# Patient Record
Sex: FEMALE | Race: BLACK OR AFRICAN AMERICAN | NOT HISPANIC OR LATINO | Employment: OTHER | URBAN - METROPOLITAN AREA
[De-identification: names, ages, dates, MRNs, and addresses within clinical notes are randomized per-mention and may not be internally consistent; named-entity substitution may affect disease eponyms.]

---

## 2023-06-05 LAB
ALANINE AMINOTRANSFERASE (SGPT) (U/L) IN SER/PLAS: 18 U/L (ref 7–45)
ALBUMIN (G/DL) IN SER/PLAS: 2.4 G/DL (ref 3.4–5)
ALKALINE PHOSPHATASE (U/L) IN SER/PLAS: 194 U/L (ref 33–110)
ANION GAP IN SER/PLAS: 10 MMOL/L (ref 10–20)
ASPARTATE AMINOTRANSFERASE (SGOT) (U/L) IN SER/PLAS: 30 U/L (ref 9–39)
BASOPHILS (10*3/UL) IN BLOOD BY AUTOMATED COUNT: 0.02 X10E9/L (ref 0–0.1)
BASOPHILS/100 LEUKOCYTES IN BLOOD BY AUTOMATED COUNT: 0.4 % (ref 0–2)
BILIRUBIN TOTAL (MG/DL) IN SER/PLAS: 0.3 MG/DL (ref 0–1.2)
BURR CELLS PRESENCE IN BLOOD BY LIGHT MICROSCOPY: NORMAL
CALCIUM (MG/DL) IN SER/PLAS: 7.8 MG/DL (ref 8.6–10.3)
CARBON DIOXIDE, TOTAL (MMOL/L) IN SER/PLAS: 21 MMOL/L (ref 21–32)
CHLORIDE (MMOL/L) IN SER/PLAS: 105 MMOL/L (ref 98–107)
CREATININE (MG/DL) IN SER/PLAS: 0.88 MG/DL (ref 0.5–1.05)
DACROCYTES PRESENCE IN BLOOD BY LIGHT MICROSCOPY: NORMAL
EOSINOPHILS (10*3/UL) IN BLOOD BY AUTOMATED COUNT: 0.08 X10E9/L (ref 0–0.7)
EOSINOPHILS/100 LEUKOCYTES IN BLOOD BY AUTOMATED COUNT: 1.6 % (ref 0–6)
ERYTHROCYTE DISTRIBUTION WIDTH (RATIO) BY AUTOMATED COUNT: 20.7 % (ref 11.5–14.5)
ERYTHROCYTE MEAN CORPUSCULAR HEMOGLOBIN CONCENTRATION (G/DL) BY AUTOMATED: 29.6 G/DL (ref 32–36)
ERYTHROCYTE MEAN CORPUSCULAR VOLUME (FL) BY AUTOMATED COUNT: 87 FL (ref 80–100)
ERYTHROCYTES (10*6/UL) IN BLOOD BY AUTOMATED COUNT: 3.69 X10E12/L (ref 4–5.2)
GFR FEMALE: 82 ML/MIN/1.73M2
GLUCOSE (MG/DL) IN SER/PLAS: 60 MG/DL (ref 74–99)
HEMATOCRIT (%) IN BLOOD BY AUTOMATED COUNT: 32.1 % (ref 36–46)
HEMOGLOBIN (G/DL) IN BLOOD: 9.5 G/DL (ref 12–16)
IMMATURE GRANULOCYTES/100 LEUKOCYTES IN BLOOD BY AUTOMATED COUNT: 0.2 % (ref 0–0.9)
LEUKOCYTES (10*3/UL) IN BLOOD BY AUTOMATED COUNT: 4.9 X10E9/L (ref 4.4–11.3)
LYMPHOCYTES (10*3/UL) IN BLOOD BY AUTOMATED COUNT: 1.75 X10E9/L (ref 1.2–4.8)
LYMPHOCYTES/100 LEUKOCYTES IN BLOOD BY AUTOMATED COUNT: 36 % (ref 13–44)
MONOCYTES (10*3/UL) IN BLOOD BY AUTOMATED COUNT: 0.53 X10E9/L (ref 0.1–1)
MONOCYTES/100 LEUKOCYTES IN BLOOD BY AUTOMATED COUNT: 10.9 % (ref 2–10)
NEUTROPHILS (10*3/UL) IN BLOOD BY AUTOMATED COUNT: 2.47 X10E9/L (ref 1.2–7.7)
NEUTROPHILS/100 LEUKOCYTES IN BLOOD BY AUTOMATED COUNT: 50.9 % (ref 40–80)
OVALOCYTES PRESENCE IN BLOOD BY LIGHT MICROSCOPY: NORMAL
PLATELET CLUMP (PRESENCE) IN BLOOD BY LIGHT MICROSCOPY: PRESENT
PLATELETS (10*3/UL) IN BLOOD AUTOMATED COUNT: 307 X10E9/L (ref 150–450)
POTASSIUM (MMOL/L) IN SER/PLAS: 4.4 MMOL/L (ref 3.5–5.3)
PROTEIN TOTAL: 8.1 G/DL (ref 6.4–8.2)
RBC MORPHOLOGY IN BLOOD: NORMAL
SCHISTOCYTES (PRESENCE) IN BLOOD BY LIGHT MICROSCOPY: NORMAL
SODIUM (MMOL/L) IN SER/PLAS: 132 MMOL/L (ref 136–145)
TRIGLYCERIDE (MG/DL) IN SER/PLAS: 185 MG/DL (ref 0–149)
UREA NITROGEN (MG/DL) IN SER/PLAS: 28 MG/DL (ref 6–23)

## 2023-06-06 LAB
MAGNESIUM (MG/DL) IN SER/PLAS: 2.6 MG/DL (ref 1.6–2.4)
PHOSPHATE (MG/DL) IN SER/PLAS: 3.9 MG/DL (ref 2.5–4.9)

## 2023-06-12 LAB
ALANINE AMINOTRANSFERASE (SGPT) (U/L) IN SER/PLAS: 14 U/L (ref 7–45)
ALBUMIN (G/DL) IN SER/PLAS: 2.6 G/DL (ref 3.4–5)
ALKALINE PHOSPHATASE (U/L) IN SER/PLAS: 174 U/L (ref 33–110)
ANION GAP IN SER/PLAS: 10 MMOL/L (ref 10–20)
ASPARTATE AMINOTRANSFERASE (SGOT) (U/L) IN SER/PLAS: 21 U/L (ref 9–39)
BILIRUBIN TOTAL (MG/DL) IN SER/PLAS: 0.3 MG/DL (ref 0–1.2)
CALCIUM (MG/DL) IN SER/PLAS: 8 MG/DL (ref 8.6–10.3)
CARBON DIOXIDE, TOTAL (MMOL/L) IN SER/PLAS: 20 MMOL/L (ref 21–32)
CHLORIDE (MMOL/L) IN SER/PLAS: 103 MMOL/L (ref 98–107)
CREATININE (MG/DL) IN SER/PLAS: 1.35 MG/DL (ref 0.5–1.05)
ERYTHROCYTE DISTRIBUTION WIDTH (RATIO) BY AUTOMATED COUNT: 18.4 % (ref 11.5–14.5)
ERYTHROCYTE MEAN CORPUSCULAR HEMOGLOBIN CONCENTRATION (G/DL) BY AUTOMATED: 31.3 G/DL (ref 32–36)
ERYTHROCYTE MEAN CORPUSCULAR VOLUME (FL) BY AUTOMATED COUNT: 84 FL (ref 80–100)
ERYTHROCYTES (10*6/UL) IN BLOOD BY AUTOMATED COUNT: 3.8 X10E12/L (ref 4–5.2)
GFR FEMALE: 49 ML/MIN/1.73M2
GLUCOSE (MG/DL) IN SER/PLAS: 71 MG/DL (ref 74–99)
HEMATOCRIT (%) IN BLOOD BY AUTOMATED COUNT: 31.9 % (ref 36–46)
HEMOGLOBIN (G/DL) IN BLOOD: 10 G/DL (ref 12–16)
LEUKOCYTES (10*3/UL) IN BLOOD BY AUTOMATED COUNT: 5.7 X10E9/L (ref 4.4–11.3)
MAGNESIUM (MG/DL) IN SER/PLAS: 2.5 MG/DL (ref 1.6–2.4)
PHOSPHATE (MG/DL) IN SER/PLAS: 3.9 MG/DL (ref 2.5–4.9)
PLATELETS (10*3/UL) IN BLOOD AUTOMATED COUNT: 271 X10E9/L (ref 150–450)
POTASSIUM (MMOL/L) IN SER/PLAS: 4.3 MMOL/L (ref 3.5–5.3)
PROTEIN TOTAL: 8.8 G/DL (ref 6.4–8.2)
SODIUM (MMOL/L) IN SER/PLAS: 129 MMOL/L (ref 136–145)
UREA NITROGEN (MG/DL) IN SER/PLAS: 31 MG/DL (ref 6–23)

## 2023-06-26 LAB
ALANINE AMINOTRANSFERASE (SGPT) (U/L) IN SER/PLAS: 8 U/L (ref 7–45)
ALBUMIN (G/DL) IN SER/PLAS: 2.3 G/DL (ref 3.4–5)
ALKALINE PHOSPHATASE (U/L) IN SER/PLAS: 168 U/L (ref 33–110)
ANION GAP IN SER/PLAS: 12 MMOL/L (ref 10–20)
ASPARTATE AMINOTRANSFERASE (SGOT) (U/L) IN SER/PLAS: 15 U/L (ref 9–39)
BASOPHILS (10*3/UL) IN BLOOD BY AUTOMATED COUNT: 0.01 X10E9/L (ref 0–0.1)
BASOPHILS/100 LEUKOCYTES IN BLOOD BY AUTOMATED COUNT: 0.1 % (ref 0–2)
BILIRUBIN TOTAL (MG/DL) IN SER/PLAS: 0.4 MG/DL (ref 0–1.2)
CALCIUM (MG/DL) IN SER/PLAS: 7.7 MG/DL (ref 8.6–10.3)
CARBON DIOXIDE, TOTAL (MMOL/L) IN SER/PLAS: 24 MMOL/L (ref 21–32)
CHLORIDE (MMOL/L) IN SER/PLAS: 102 MMOL/L (ref 98–107)
CREATININE (MG/DL) IN SER/PLAS: 1.3 MG/DL (ref 0.5–1.05)
EOSINOPHILS (10*3/UL) IN BLOOD BY AUTOMATED COUNT: 0.05 X10E9/L (ref 0–0.7)
EOSINOPHILS/100 LEUKOCYTES IN BLOOD BY AUTOMATED COUNT: 0.7 % (ref 0–6)
ERYTHROCYTE DISTRIBUTION WIDTH (RATIO) BY AUTOMATED COUNT: 17 % (ref 11.5–14.5)
ERYTHROCYTE MEAN CORPUSCULAR HEMOGLOBIN CONCENTRATION (G/DL) BY AUTOMATED: 32 G/DL (ref 32–36)
ERYTHROCYTE MEAN CORPUSCULAR VOLUME (FL) BY AUTOMATED COUNT: 82 FL (ref 80–100)
ERYTHROCYTES (10*6/UL) IN BLOOD BY AUTOMATED COUNT: 3.44 X10E12/L (ref 4–5.2)
GFR FEMALE: 51 ML/MIN/1.73M2
GLUCOSE (MG/DL) IN SER/PLAS: 84 MG/DL (ref 74–99)
HEMATOCRIT (%) IN BLOOD BY AUTOMATED COUNT: 28.1 % (ref 36–46)
HEMOGLOBIN (G/DL) IN BLOOD: 9 G/DL (ref 12–16)
IMMATURE GRANULOCYTES/100 LEUKOCYTES IN BLOOD BY AUTOMATED COUNT: 0.4 % (ref 0–0.9)
LEUKOCYTES (10*3/UL) IN BLOOD BY AUTOMATED COUNT: 7.4 X10E9/L (ref 4.4–11.3)
LYMPHOCYTES (10*3/UL) IN BLOOD BY AUTOMATED COUNT: 1.03 X10E9/L (ref 1.2–4.8)
LYMPHOCYTES/100 LEUKOCYTES IN BLOOD BY AUTOMATED COUNT: 14 % (ref 13–44)
MAGNESIUM (MG/DL) IN SER/PLAS: 1.9 MG/DL (ref 1.6–2.4)
MONOCYTES (10*3/UL) IN BLOOD BY AUTOMATED COUNT: 0.56 X10E9/L (ref 0.1–1)
MONOCYTES/100 LEUKOCYTES IN BLOOD BY AUTOMATED COUNT: 7.6 % (ref 2–10)
NEUTROPHILS (10*3/UL) IN BLOOD BY AUTOMATED COUNT: 5.69 X10E9/L (ref 1.2–7.7)
NEUTROPHILS/100 LEUKOCYTES IN BLOOD BY AUTOMATED COUNT: 77.2 % (ref 40–80)
PHOSPHATE (MG/DL) IN SER/PLAS: 2.6 MG/DL (ref 2.5–4.9)
PLATELETS (10*3/UL) IN BLOOD AUTOMATED COUNT: 260 X10E9/L (ref 150–450)
POTASSIUM (MMOL/L) IN SER/PLAS: 3.5 MMOL/L (ref 3.5–5.3)
PROTEIN TOTAL: 7.6 G/DL (ref 6.4–8.2)
SODIUM (MMOL/L) IN SER/PLAS: 134 MMOL/L (ref 136–145)
TRIGLYCERIDE (MG/DL) IN SER/PLAS: 91 MG/DL (ref 0–149)
UREA NITROGEN (MG/DL) IN SER/PLAS: 32 MG/DL (ref 6–23)

## 2023-07-03 LAB
ALANINE AMINOTRANSFERASE (SGPT) (U/L) IN SER/PLAS: 8 U/L (ref 7–45)
ALBUMIN (G/DL) IN SER/PLAS: 2.2 G/DL (ref 3.4–5)
ALKALINE PHOSPHATASE (U/L) IN SER/PLAS: 188 U/L (ref 33–110)
ANION GAP IN SER/PLAS: 10 MMOL/L (ref 10–20)
ASPARTATE AMINOTRANSFERASE (SGOT) (U/L) IN SER/PLAS: 21 U/L (ref 9–39)
BILIRUBIN TOTAL (MG/DL) IN SER/PLAS: 0.6 MG/DL (ref 0–1.2)
CALCIUM (MG/DL) IN SER/PLAS: 7.2 MG/DL (ref 8.6–10.3)
CARBON DIOXIDE, TOTAL (MMOL/L) IN SER/PLAS: 29 MMOL/L (ref 21–32)
CHLORIDE (MMOL/L) IN SER/PLAS: 98 MMOL/L (ref 98–107)
CREATININE (MG/DL) IN SER/PLAS: 1.37 MG/DL (ref 0.5–1.05)
ERYTHROCYTE DISTRIBUTION WIDTH (RATIO) BY AUTOMATED COUNT: 16.1 % (ref 11.5–14.5)
ERYTHROCYTE MEAN CORPUSCULAR HEMOGLOBIN CONCENTRATION (G/DL) BY AUTOMATED: 31 G/DL (ref 32–36)
ERYTHROCYTE MEAN CORPUSCULAR VOLUME (FL) BY AUTOMATED COUNT: 82 FL (ref 80–100)
ERYTHROCYTES (10*6/UL) IN BLOOD BY AUTOMATED COUNT: 3 X10E12/L (ref 4–5.2)
GFR FEMALE: 48 ML/MIN/1.73M2
GLUCOSE (MG/DL) IN SER/PLAS: 83 MG/DL (ref 74–99)
HEMATOCRIT (%) IN BLOOD BY AUTOMATED COUNT: 24.5 % (ref 36–46)
HEMOGLOBIN (G/DL) IN BLOOD: 7.6 G/DL (ref 12–16)
LEUKOCYTES (10*3/UL) IN BLOOD BY AUTOMATED COUNT: 4.8 X10E9/L (ref 4.4–11.3)
MAGNESIUM (MG/DL) IN SER/PLAS: 2.2 MG/DL (ref 1.6–2.4)
PHOSPHATE (MG/DL) IN SER/PLAS: 2.6 MG/DL (ref 2.5–4.9)
PLATELETS (10*3/UL) IN BLOOD AUTOMATED COUNT: 265 X10E9/L (ref 150–450)
POTASSIUM (MMOL/L) IN SER/PLAS: 3.3 MMOL/L (ref 3.5–5.3)
PROTEIN TOTAL: 7.7 G/DL (ref 6.4–8.2)
SODIUM (MMOL/L) IN SER/PLAS: 134 MMOL/L (ref 136–145)
TRIGLYCERIDE (MG/DL) IN SER/PLAS: 131 MG/DL (ref 0–149)
UREA NITROGEN (MG/DL) IN SER/PLAS: 43 MG/DL (ref 6–23)

## 2023-07-10 LAB
ALANINE AMINOTRANSFERASE (SGPT) (U/L) IN SER/PLAS: 6 U/L (ref 7–45)
ALBUMIN (G/DL) IN SER/PLAS: 2.2 G/DL (ref 3.4–5)
ALKALINE PHOSPHATASE (U/L) IN SER/PLAS: 165 U/L (ref 33–110)
ANION GAP IN SER/PLAS: 12 MMOL/L (ref 10–20)
ASPARTATE AMINOTRANSFERASE (SGOT) (U/L) IN SER/PLAS: 13 U/L (ref 9–39)
BASOPHILS (10*3/UL) IN BLOOD BY AUTOMATED COUNT: 0.01 X10E9/L (ref 0–0.1)
BASOPHILS/100 LEUKOCYTES IN BLOOD BY AUTOMATED COUNT: 0.2 % (ref 0–2)
BILIRUBIN TOTAL (MG/DL) IN SER/PLAS: 0.3 MG/DL (ref 0–1.2)
CALCIUM (MG/DL) IN SER/PLAS: 7.6 MG/DL (ref 8.6–10.3)
CARBON DIOXIDE, TOTAL (MMOL/L) IN SER/PLAS: 24 MMOL/L (ref 21–32)
CHLORIDE (MMOL/L) IN SER/PLAS: 100 MMOL/L (ref 98–107)
CREATININE (MG/DL) IN SER/PLAS: 1.69 MG/DL (ref 0.5–1.05)
EOSINOPHILS (10*3/UL) IN BLOOD BY AUTOMATED COUNT: 0.02 X10E9/L (ref 0–0.7)
EOSINOPHILS/100 LEUKOCYTES IN BLOOD BY AUTOMATED COUNT: 0.4 % (ref 0–6)
ERYTHROCYTE DISTRIBUTION WIDTH (RATIO) BY AUTOMATED COUNT: 16.3 % (ref 11.5–14.5)
ERYTHROCYTE MEAN CORPUSCULAR HEMOGLOBIN CONCENTRATION (G/DL) BY AUTOMATED: 31 G/DL (ref 32–36)
ERYTHROCYTE MEAN CORPUSCULAR VOLUME (FL) BY AUTOMATED COUNT: 81 FL (ref 80–100)
ERYTHROCYTES (10*6/UL) IN BLOOD BY AUTOMATED COUNT: 2.97 X10E12/L (ref 4–5.2)
GFR FEMALE: 37 ML/MIN/1.73M2
GLUCOSE (MG/DL) IN SER/PLAS: 85 MG/DL (ref 74–99)
HEMATOCRIT (%) IN BLOOD BY AUTOMATED COUNT: 24.2 % (ref 36–46)
HEMOGLOBIN (G/DL) IN BLOOD: 7.5 G/DL (ref 12–16)
IMMATURE GRANULOCYTES/100 LEUKOCYTES IN BLOOD BY AUTOMATED COUNT: 0.4 % (ref 0–0.9)
LEUKOCYTES (10*3/UL) IN BLOOD BY AUTOMATED COUNT: 4.8 X10E9/L (ref 4.4–11.3)
LYMPHOCYTES (10*3/UL) IN BLOOD BY AUTOMATED COUNT: 1.73 X10E9/L (ref 1.2–4.8)
LYMPHOCYTES/100 LEUKOCYTES IN BLOOD BY AUTOMATED COUNT: 35.7 % (ref 13–44)
MAGNESIUM (MG/DL) IN SER/PLAS: 2.4 MG/DL (ref 1.6–2.4)
MONOCYTES (10*3/UL) IN BLOOD BY AUTOMATED COUNT: 0.58 X10E9/L (ref 0.1–1)
MONOCYTES/100 LEUKOCYTES IN BLOOD BY AUTOMATED COUNT: 12 % (ref 2–10)
NEUTROPHILS (10*3/UL) IN BLOOD BY AUTOMATED COUNT: 2.48 X10E9/L (ref 1.2–7.7)
NEUTROPHILS/100 LEUKOCYTES IN BLOOD BY AUTOMATED COUNT: 51.3 % (ref 40–80)
PHOSPHATE (MG/DL) IN SER/PLAS: 4.7 MG/DL (ref 2.5–4.9)
PLATELETS (10*3/UL) IN BLOOD AUTOMATED COUNT: 310 X10E9/L (ref 150–450)
POTASSIUM (MMOL/L) IN SER/PLAS: 3.3 MMOL/L (ref 3.5–5.3)
PROTEIN TOTAL: 7.7 G/DL (ref 6.4–8.2)
SODIUM (MMOL/L) IN SER/PLAS: 133 MMOL/L (ref 136–145)
TRIGLYCERIDE (MG/DL) IN SER/PLAS: 127 MG/DL (ref 0–149)
UREA NITROGEN (MG/DL) IN SER/PLAS: 65 MG/DL (ref 6–23)

## 2023-07-17 LAB
ALANINE AMINOTRANSFERASE (SGPT) (U/L) IN SER/PLAS: 10 U/L (ref 7–45)
ALBUMIN (G/DL) IN SER/PLAS: 1.8 G/DL (ref 3.4–5)
ALKALINE PHOSPHATASE (U/L) IN SER/PLAS: 186 U/L (ref 33–110)
ANION GAP IN SER/PLAS: 10 MMOL/L (ref 10–20)
ASPARTATE AMINOTRANSFERASE (SGOT) (U/L) IN SER/PLAS: 26 U/L (ref 9–39)
BASOPHILS (10*3/UL) IN BLOOD BY AUTOMATED COUNT: 0.01 X10E9/L (ref 0–0.1)
BASOPHILS/100 LEUKOCYTES IN BLOOD BY AUTOMATED COUNT: 0.2 % (ref 0–2)
BILIRUBIN TOTAL (MG/DL) IN SER/PLAS: 0.3 MG/DL (ref 0–1.2)
CALCIUM (MG/DL) IN SER/PLAS: 7.2 MG/DL (ref 8.6–10.3)
CARBON DIOXIDE, TOTAL (MMOL/L) IN SER/PLAS: 24 MMOL/L (ref 21–32)
CHLORIDE (MMOL/L) IN SER/PLAS: 106 MMOL/L (ref 98–107)
CREATININE (MG/DL) IN SER/PLAS: 1.06 MG/DL (ref 0.5–1.05)
EOSINOPHILS (10*3/UL) IN BLOOD BY AUTOMATED COUNT: 0.01 X10E9/L (ref 0–0.7)
EOSINOPHILS/100 LEUKOCYTES IN BLOOD BY AUTOMATED COUNT: 0.2 % (ref 0–6)
ERYTHROCYTE DISTRIBUTION WIDTH (RATIO) BY AUTOMATED COUNT: 16.5 % (ref 11.5–14.5)
ERYTHROCYTE MEAN CORPUSCULAR HEMOGLOBIN CONCENTRATION (G/DL) BY AUTOMATED: 30.5 G/DL (ref 32–36)
ERYTHROCYTE MEAN CORPUSCULAR VOLUME (FL) BY AUTOMATED COUNT: 86 FL (ref 80–100)
ERYTHROCYTES (10*6/UL) IN BLOOD BY AUTOMATED COUNT: 2.71 X10E12/L (ref 4–5.2)
GFR FEMALE: 65 ML/MIN/1.73M2
GLUCOSE (MG/DL) IN SER/PLAS: 69 MG/DL (ref 74–99)
HEMATOCRIT (%) IN BLOOD BY AUTOMATED COUNT: 23.3 % (ref 36–46)
HEMOGLOBIN (G/DL) IN BLOOD: 7.1 G/DL (ref 12–16)
IMMATURE GRANULOCYTES/100 LEUKOCYTES IN BLOOD BY AUTOMATED COUNT: 0.4 % (ref 0–0.9)
LEUKOCYTES (10*3/UL) IN BLOOD BY AUTOMATED COUNT: 5.1 X10E9/L (ref 4.4–11.3)
LYMPHOCYTES (10*3/UL) IN BLOOD BY AUTOMATED COUNT: 1.38 X10E9/L (ref 1.2–4.8)
LYMPHOCYTES/100 LEUKOCYTES IN BLOOD BY AUTOMATED COUNT: 27.3 % (ref 13–44)
MAGNESIUM (MG/DL) IN SER/PLAS: 1.8 MG/DL (ref 1.6–2.4)
MONOCYTES (10*3/UL) IN BLOOD BY AUTOMATED COUNT: 0.51 X10E9/L (ref 0.1–1)
MONOCYTES/100 LEUKOCYTES IN BLOOD BY AUTOMATED COUNT: 10.1 % (ref 2–10)
NEUTROPHILS (10*3/UL) IN BLOOD BY AUTOMATED COUNT: 3.12 X10E9/L (ref 1.2–7.7)
NEUTROPHILS/100 LEUKOCYTES IN BLOOD BY AUTOMATED COUNT: 61.8 % (ref 40–80)
PHOSPHATE (MG/DL) IN SER/PLAS: 2.1 MG/DL (ref 2.5–4.9)
PLATELETS (10*3/UL) IN BLOOD AUTOMATED COUNT: 246 X10E9/L (ref 150–450)
POTASSIUM (MMOL/L) IN SER/PLAS: 3.4 MMOL/L (ref 3.5–5.3)
PROTEIN TOTAL: 6.5 G/DL (ref 6.4–8.2)
SODIUM (MMOL/L) IN SER/PLAS: 137 MMOL/L (ref 136–145)
TRIGLYCERIDE (MG/DL) IN SER/PLAS: 81 MG/DL (ref 0–149)
UREA NITROGEN (MG/DL) IN SER/PLAS: 29 MG/DL (ref 6–23)

## 2023-08-01 LAB
ALANINE AMINOTRANSFERASE (SGPT) (U/L) IN SER/PLAS: 21 U/L (ref 7–45)
ALBUMIN (G/DL) IN SER/PLAS: 2.3 G/DL (ref 3.4–5)
ALKALINE PHOSPHATASE (U/L) IN SER/PLAS: 171 U/L (ref 33–110)
ANION GAP IN SER/PLAS: 11 MMOL/L (ref 10–20)
ASPARTATE AMINOTRANSFERASE (SGOT) (U/L) IN SER/PLAS: 44 U/L (ref 9–39)
BASOPHILS (10*3/UL) IN BLOOD BY AUTOMATED COUNT: 0.03 X10E9/L (ref 0–0.1)
BASOPHILS/100 LEUKOCYTES IN BLOOD BY AUTOMATED COUNT: 0.7 % (ref 0–2)
BILIRUBIN TOTAL (MG/DL) IN SER/PLAS: 0.3 MG/DL (ref 0–1.2)
BURR CELLS PRESENCE IN BLOOD BY LIGHT MICROSCOPY: NORMAL
C REACTIVE PROTEIN (MG/L) IN SER/PLAS: 0.26 MG/DL
CALCIUM (MG/DL) IN SER/PLAS: 8 MG/DL (ref 8.6–10.3)
CARBON DIOXIDE, TOTAL (MMOL/L) IN SER/PLAS: 21 MMOL/L (ref 21–32)
CHLORIDE (MMOL/L) IN SER/PLAS: 108 MMOL/L (ref 98–107)
CREATININE (MG/DL) IN SER/PLAS: 0.88 MG/DL (ref 0.5–1.05)
EOSINOPHILS (10*3/UL) IN BLOOD BY AUTOMATED COUNT: 0.09 X10E9/L (ref 0–0.7)
EOSINOPHILS/100 LEUKOCYTES IN BLOOD BY AUTOMATED COUNT: 2 % (ref 0–6)
ERYTHROCYTE DISTRIBUTION WIDTH (RATIO) BY AUTOMATED COUNT: 20.5 % (ref 11.5–14.5)
ERYTHROCYTE MEAN CORPUSCULAR HEMOGLOBIN CONCENTRATION (G/DL) BY AUTOMATED: 29.6 G/DL (ref 32–36)
ERYTHROCYTE MEAN CORPUSCULAR VOLUME (FL) BY AUTOMATED COUNT: 94 FL (ref 80–100)
ERYTHROCYTES (10*6/UL) IN BLOOD BY AUTOMATED COUNT: 3.49 X10E12/L (ref 4–5.2)
GFR FEMALE: 81 ML/MIN/1.73M2
GLUCOSE (MG/DL) IN SER/PLAS: 77 MG/DL (ref 74–99)
HEMATOCRIT (%) IN BLOOD BY AUTOMATED COUNT: 32.8 % (ref 36–46)
HEMOGLOBIN (G/DL) IN BLOOD: 9.7 G/DL (ref 12–16)
IMMATURE GRANULOCYTES/100 LEUKOCYTES IN BLOOD BY AUTOMATED COUNT: 0 % (ref 0–0.9)
LEUKOCYTES (10*3/UL) IN BLOOD BY AUTOMATED COUNT: 4.6 X10E9/L (ref 4.4–11.3)
LYMPHOCYTES (10*3/UL) IN BLOOD BY AUTOMATED COUNT: 1.86 X10E9/L (ref 1.2–4.8)
LYMPHOCYTES/100 LEUKOCYTES IN BLOOD BY AUTOMATED COUNT: 40.8 % (ref 13–44)
MAGNESIUM (MG/DL) IN SER/PLAS: 1.9 MG/DL (ref 1.6–2.4)
MONOCYTES (10*3/UL) IN BLOOD BY AUTOMATED COUNT: 0.31 X10E9/L (ref 0.1–1)
MONOCYTES/100 LEUKOCYTES IN BLOOD BY AUTOMATED COUNT: 6.8 % (ref 2–10)
NEUTROPHILS (10*3/UL) IN BLOOD BY AUTOMATED COUNT: 2.27 X10E9/L (ref 1.2–7.7)
NEUTROPHILS/100 LEUKOCYTES IN BLOOD BY AUTOMATED COUNT: 49.7 % (ref 40–80)
PHOSPHATE (MG/DL) IN SER/PLAS: 3.9 MG/DL (ref 2.5–4.9)
PLATELETS (10*3/UL) IN BLOOD AUTOMATED COUNT: 301 X10E9/L (ref 150–450)
POTASSIUM (MMOL/L) IN SER/PLAS: 3.9 MMOL/L (ref 3.5–5.3)
PREALBUMIN (MG/DL) IN SERUM OR PLASMA: 16.8 MG/DL (ref 18–40)
PROTEIN TOTAL: 7.8 G/DL (ref 6.4–8.2)
RBC MORPHOLOGY IN BLOOD: NORMAL
SCHISTOCYTES (PRESENCE) IN BLOOD BY LIGHT MICROSCOPY: NORMAL
SEDIMENTATION RATE, ERYTHROCYTE: 77 MM/H (ref 0–20)
SODIUM (MMOL/L) IN SER/PLAS: 136 MMOL/L (ref 136–145)
TRIGLYCERIDE (MG/DL) IN SER/PLAS: 101 MG/DL (ref 0–149)
UREA NITROGEN (MG/DL) IN SER/PLAS: 15 MG/DL (ref 6–23)

## 2023-08-24 LAB
ALANINE AMINOTRANSFERASE (SGPT) (U/L) IN SER/PLAS: 9 U/L (ref 7–45)
ALBUMIN (G/DL) IN SER/PLAS: 2.4 G/DL (ref 3.4–5)
ALKALINE PHOSPHATASE (U/L) IN SER/PLAS: 220 U/L (ref 33–110)
ANION GAP IN SER/PLAS: 12 MMOL/L (ref 10–20)
ASPARTATE AMINOTRANSFERASE (SGOT) (U/L) IN SER/PLAS: 42 U/L (ref 9–39)
BASOPHILS (10*3/UL) IN BLOOD BY AUTOMATED COUNT: 0.03 X10E9/L (ref 0–0.1)
BASOPHILS/100 LEUKOCYTES IN BLOOD BY AUTOMATED COUNT: 0.7 % (ref 0–2)
BILIRUBIN TOTAL (MG/DL) IN SER/PLAS: 0.3 MG/DL (ref 0–1.2)
CALCIUM (MG/DL) IN SER/PLAS: 7.5 MG/DL (ref 8.6–10.3)
CARBON DIOXIDE, TOTAL (MMOL/L) IN SER/PLAS: 18 MMOL/L (ref 21–32)
CHLORIDE (MMOL/L) IN SER/PLAS: 111 MMOL/L (ref 98–107)
CREATINE KINASE (U/L) IN SER/PLAS: 54 U/L (ref 0–215)
CREATININE (MG/DL) IN SER/PLAS: 1.35 MG/DL (ref 0.5–1.05)
EOSINOPHILS (10*3/UL) IN BLOOD BY AUTOMATED COUNT: 0.12 X10E9/L (ref 0–0.7)
EOSINOPHILS/100 LEUKOCYTES IN BLOOD BY AUTOMATED COUNT: 2.6 % (ref 0–6)
ERYTHROCYTE DISTRIBUTION WIDTH (RATIO) BY AUTOMATED COUNT: 17.7 % (ref 11.5–14.5)
ERYTHROCYTE MEAN CORPUSCULAR HEMOGLOBIN CONCENTRATION (G/DL) BY AUTOMATED: 30.4 G/DL (ref 32–36)
ERYTHROCYTE MEAN CORPUSCULAR VOLUME (FL) BY AUTOMATED COUNT: 90 FL (ref 80–100)
ERYTHROCYTES (10*6/UL) IN BLOOD BY AUTOMATED COUNT: 3.17 X10E12/L (ref 4–5.2)
GFR FEMALE: 49 ML/MIN/1.73M2
GLUCOSE (MG/DL) IN SER/PLAS: 87 MG/DL (ref 74–99)
HEMATOCRIT (%) IN BLOOD BY AUTOMATED COUNT: 28.6 % (ref 36–46)
HEMOGLOBIN (G/DL) IN BLOOD: 8.7 G/DL (ref 12–16)
IMMATURE GRANULOCYTES/100 LEUKOCYTES IN BLOOD BY AUTOMATED COUNT: 0.2 % (ref 0–0.9)
LEUKOCYTES (10*3/UL) IN BLOOD BY AUTOMATED COUNT: 4.5 X10E9/L (ref 4.4–11.3)
LYMPHOCYTES (10*3/UL) IN BLOOD BY AUTOMATED COUNT: 1.78 X10E9/L (ref 1.2–4.8)
LYMPHOCYTES/100 LEUKOCYTES IN BLOOD BY AUTOMATED COUNT: 39.3 % (ref 13–44)
MAGNESIUM (MG/DL) IN SER/PLAS: 2.1 MG/DL (ref 1.6–2.4)
MONOCYTES (10*3/UL) IN BLOOD BY AUTOMATED COUNT: 0.43 X10E9/L (ref 0.1–1)
MONOCYTES/100 LEUKOCYTES IN BLOOD BY AUTOMATED COUNT: 9.5 % (ref 2–10)
NEUTROPHILS (10*3/UL) IN BLOOD BY AUTOMATED COUNT: 2.16 X10E9/L (ref 1.2–7.7)
NEUTROPHILS/100 LEUKOCYTES IN BLOOD BY AUTOMATED COUNT: 47.7 % (ref 40–80)
PHOSPHATE (MG/DL) IN SER/PLAS: 4.5 MG/DL (ref 2.5–4.9)
PLATELETS (10*3/UL) IN BLOOD AUTOMATED COUNT: 326 X10E9/L (ref 150–450)
POTASSIUM (MMOL/L) IN SER/PLAS: 4.5 MMOL/L (ref 3.5–5.3)
PROTEIN TOTAL: 7.3 G/DL (ref 6.4–8.2)
SODIUM (MMOL/L) IN SER/PLAS: 136 MMOL/L (ref 136–145)
TRIGLYCERIDE (MG/DL) IN SER/PLAS: 85 MG/DL (ref 0–149)
UREA NITROGEN (MG/DL) IN SER/PLAS: 20 MG/DL (ref 6–23)

## 2023-08-29 LAB
ALANINE AMINOTRANSFERASE (SGPT) (U/L) IN SER/PLAS: 4 U/L (ref 7–45)
ALBUMIN (G/DL) IN SER/PLAS: 2.3 G/DL (ref 3.4–5)
ALKALINE PHOSPHATASE (U/L) IN SER/PLAS: 207 U/L (ref 33–110)
ANION GAP IN SER/PLAS: 9 MMOL/L (ref 10–20)
ASPARTATE AMINOTRANSFERASE (SGOT) (U/L) IN SER/PLAS: 23 U/L (ref 9–39)
BASOPHILS (10*3/UL) IN BLOOD BY AUTOMATED COUNT: 0.02 X10E9/L (ref 0–0.1)
BASOPHILS/100 LEUKOCYTES IN BLOOD BY AUTOMATED COUNT: 0.4 % (ref 0–2)
BILIRUBIN TOTAL (MG/DL) IN SER/PLAS: 0.3 MG/DL (ref 0–1.2)
CALCIUM (MG/DL) IN SER/PLAS: 7.4 MG/DL (ref 8.6–10.3)
CARBON DIOXIDE, TOTAL (MMOL/L) IN SER/PLAS: 18 MMOL/L (ref 21–32)
CHLORIDE (MMOL/L) IN SER/PLAS: 115 MMOL/L (ref 98–107)
CREATINE KINASE (U/L) IN SER/PLAS: 38 U/L (ref 0–215)
CREATININE (MG/DL) IN SER/PLAS: 1.21 MG/DL (ref 0.5–1.05)
EOSINOPHILS (10*3/UL) IN BLOOD BY AUTOMATED COUNT: 0.14 X10E9/L (ref 0–0.7)
EOSINOPHILS/100 LEUKOCYTES IN BLOOD BY AUTOMATED COUNT: 2.8 % (ref 0–6)
ERYTHROCYTE DISTRIBUTION WIDTH (RATIO) BY AUTOMATED COUNT: 17.3 % (ref 11.5–14.5)
ERYTHROCYTE MEAN CORPUSCULAR HEMOGLOBIN CONCENTRATION (G/DL) BY AUTOMATED: 30.7 G/DL (ref 32–36)
ERYTHROCYTE MEAN CORPUSCULAR VOLUME (FL) BY AUTOMATED COUNT: 91 FL (ref 80–100)
ERYTHROCYTES (10*6/UL) IN BLOOD BY AUTOMATED COUNT: 3.4 X10E12/L (ref 4–5.2)
GFR FEMALE: 56 ML/MIN/1.73M2
GLUCOSE (MG/DL) IN SER/PLAS: 67 MG/DL (ref 74–99)
HEMATOCRIT (%) IN BLOOD BY AUTOMATED COUNT: 30.9 % (ref 36–46)
HEMOGLOBIN (G/DL) IN BLOOD: 9.5 G/DL (ref 12–16)
IMMATURE GRANULOCYTES/100 LEUKOCYTES IN BLOOD BY AUTOMATED COUNT: 0.2 % (ref 0–0.9)
LEUKOCYTES (10*3/UL) IN BLOOD BY AUTOMATED COUNT: 5.1 X10E9/L (ref 4.4–11.3)
LYMPHOCYTES (10*3/UL) IN BLOOD BY AUTOMATED COUNT: 1.91 X10E9/L (ref 1.2–4.8)
LYMPHOCYTES/100 LEUKOCYTES IN BLOOD BY AUTOMATED COUNT: 37.8 % (ref 13–44)
MAGNESIUM (MG/DL) IN SER/PLAS: 1.7 MG/DL (ref 1.6–2.4)
MONOCYTES (10*3/UL) IN BLOOD BY AUTOMATED COUNT: 0.39 X10E9/L (ref 0.1–1)
MONOCYTES/100 LEUKOCYTES IN BLOOD BY AUTOMATED COUNT: 7.7 % (ref 2–10)
NEUTROPHILS (10*3/UL) IN BLOOD BY AUTOMATED COUNT: 2.58 X10E9/L (ref 1.2–7.7)
NEUTROPHILS/100 LEUKOCYTES IN BLOOD BY AUTOMATED COUNT: 51.1 % (ref 40–80)
PHOSPHATE (MG/DL) IN SER/PLAS: 3.7 MG/DL (ref 2.5–4.9)
PLATELETS (10*3/UL) IN BLOOD AUTOMATED COUNT: 259 X10E9/L (ref 150–450)
POTASSIUM (MMOL/L) IN SER/PLAS: 4.4 MMOL/L (ref 3.5–5.3)
PROTEIN TOTAL: 7.1 G/DL (ref 6.4–8.2)
SODIUM (MMOL/L) IN SER/PLAS: 138 MMOL/L (ref 136–145)
TRIGLYCERIDE (MG/DL) IN SER/PLAS: 82 MG/DL (ref 0–149)
UREA NITROGEN (MG/DL) IN SER/PLAS: 16 MG/DL (ref 6–23)

## 2023-09-03 PROBLEM — E86.1 HYPOVOLEMIA: Status: ACTIVE | Noted: 2023-06-19

## 2023-09-03 PROBLEM — M17.9 OSTEOARTHRITIS OF KNEE: Status: ACTIVE | Noted: 2023-09-03

## 2023-09-03 PROBLEM — R63.2 POLYPHAGIA: Status: ACTIVE | Noted: 2023-09-03

## 2023-09-03 PROBLEM — K43.2 RECURRENT INCISIONAL HERNIA: Status: ACTIVE | Noted: 2023-09-03

## 2023-09-03 PROBLEM — F41.8 DEPRESSION WITH ANXIETY: Status: ACTIVE | Noted: 2023-09-03

## 2023-09-03 PROBLEM — G47.33 OBSTRUCTIVE SLEEP APNEA: Status: ACTIVE | Noted: 2023-09-03

## 2023-09-03 PROBLEM — R29.6 FREQUENT FALLS: Status: ACTIVE | Noted: 2023-09-03

## 2023-09-03 PROBLEM — I82.409 DVT (DEEP VENOUS THROMBOSIS) (MULTI): Status: ACTIVE | Noted: 2023-09-03

## 2023-09-03 PROBLEM — N18.30 CHRONIC KIDNEY DISEASE, STAGE III (MODERATE) (MULTI): Status: ACTIVE | Noted: 2023-09-03

## 2023-09-03 PROBLEM — H35.413 LATTICE DEGENERATION OF BOTH RETINAS: Status: ACTIVE | Noted: 2023-09-03

## 2023-09-03 PROBLEM — R35.0 URINARY FREQUENCY: Status: ACTIVE | Noted: 2023-09-03

## 2023-09-03 PROBLEM — H52.4 PRESBYOPIA OF BOTH EYES: Status: ACTIVE | Noted: 2023-09-03

## 2023-09-03 PROBLEM — B20 HIV INFECTION (MULTI): Chronic | Status: ACTIVE | Noted: 2021-09-29

## 2023-09-03 PROBLEM — Z21 HIV INFECTION: Chronic | Status: ACTIVE | Noted: 2021-09-29

## 2023-09-03 PROBLEM — F40.10 SOCIAL ANXIETY DISORDER: Status: ACTIVE | Noted: 2021-11-01

## 2023-09-03 PROBLEM — G47.8 POOR SLEEP PATTERN: Status: ACTIVE | Noted: 2023-09-03

## 2023-09-03 PROBLEM — F33.3 DEPRESSION, MAJOR, RECURRENT, SEVERE WITH PSYCHOSIS (MULTI): Chronic | Status: ACTIVE | Noted: 2021-11-01

## 2023-09-03 PROBLEM — I26.99 PULMONARY EMBOLISM (MULTI): Status: ACTIVE | Noted: 2023-09-03

## 2023-09-03 PROBLEM — N17.9 AKI (ACUTE KIDNEY INJURY) (CMS-HCC): Status: ACTIVE | Noted: 2023-06-19

## 2023-09-03 PROBLEM — H31.002 RETINAL SCAR OF LEFT EYE: Status: ACTIVE | Noted: 2023-09-03

## 2023-09-03 PROBLEM — M17.11 DEGENERATIVE JOINT DISEASE OF KNEE, RIGHT: Status: ACTIVE | Noted: 2023-09-03

## 2023-09-03 PROBLEM — E66.01 OBESITY, MORBID (MORE THAN 100 LBS OVER IDEAL WEIGHT OR BMI > 40) (MULTI): Status: ACTIVE | Noted: 2023-09-03

## 2023-09-03 PROBLEM — F43.10 PTSD (POST-TRAUMATIC STRESS DISORDER): Status: ACTIVE | Noted: 2021-09-29

## 2023-09-03 PROBLEM — E55.9 VITAMIN D DEFICIENCY: Status: ACTIVE | Noted: 2023-09-03

## 2023-09-03 PROBLEM — H43.393 VITREOUS OPACITIES OF BOTH EYES: Status: ACTIVE | Noted: 2023-09-03

## 2023-09-03 PROBLEM — I10 HYPERTENSION: Status: ACTIVE | Noted: 2023-09-03

## 2023-09-03 PROBLEM — J45.909 ASTHMA (HHS-HCC): Status: ACTIVE | Noted: 2023-09-03

## 2023-09-03 PROBLEM — O10.919: Status: ACTIVE | Noted: 2023-09-03

## 2023-09-03 PROBLEM — M17.0 PRIMARY OSTEOARTHRITIS OF BOTH KNEES: Status: ACTIVE | Noted: 2023-09-03

## 2023-09-03 PROBLEM — E88.09 SERUM ALBUMIN DECREASED: Status: ACTIVE | Noted: 2023-09-03

## 2023-09-03 PROBLEM — R79.89 ELEVATED SERUM CREATININE: Status: ACTIVE | Noted: 2023-09-03

## 2023-09-03 PROBLEM — T83.9XXA IUD COMPLICATION (CMS-HCC): Status: ACTIVE | Noted: 2023-09-03

## 2023-09-03 PROBLEM — K63.2 ENTEROCUTANEOUS FISTULA: Status: ACTIVE | Noted: 2023-09-03

## 2023-09-03 PROBLEM — K21.9 GERD (GASTROESOPHAGEAL REFLUX DISEASE): Status: ACTIVE | Noted: 2023-09-03

## 2023-09-03 PROBLEM — D50.9 IRON DEFICIENCY ANEMIA: Status: ACTIVE | Noted: 2023-09-03

## 2023-09-03 RX ORDER — TRAZODONE HYDROCHLORIDE 100 MG/1
100 TABLET ORAL
COMMUNITY
Start: 2021-01-19 | End: 2023-10-31

## 2023-09-03 RX ORDER — HYDROCHLOROTHIAZIDE 12.5 MG/1
12.5 CAPSULE ORAL
COMMUNITY
Start: 2021-05-25 | End: 2023-10-18 | Stop reason: SDUPTHER

## 2023-09-03 RX ORDER — ERGOCALCIFEROL 1.25 MG/1
1 CAPSULE ORAL WEEKLY
COMMUNITY
Start: 2017-02-21 | End: 2023-10-10 | Stop reason: WASHOUT

## 2023-09-03 RX ORDER — FERROUS SULFATE 325(65) MG
325 TABLET ORAL
COMMUNITY
Start: 2014-04-09 | End: 2023-10-18 | Stop reason: SDUPTHER

## 2023-09-03 RX ORDER — TENOFOVIR ALAFENAMIDE 25 MG/1
1 TABLET ORAL
COMMUNITY
Start: 2022-09-14 | End: 2023-10-10 | Stop reason: WASHOUT

## 2023-09-03 RX ORDER — POLYETHYLENE GLYCOL 3350 17 G/17G
POWDER, FOR SOLUTION ORAL
COMMUNITY
Start: 2021-01-15 | End: 2023-10-10 | Stop reason: WASHOUT

## 2023-09-03 RX ORDER — ALBUTEROL SULFATE 90 UG/1
1-2 AEROSOL, METERED RESPIRATORY (INHALATION) EVERY 6 HOURS PRN
COMMUNITY
Start: 2014-04-09

## 2023-09-03 RX ORDER — DOLUTEGRAVIR SODIUM AND LAMIVUDINE 50; 300 MG/1; MG/1
1 TABLET, FILM COATED ORAL
COMMUNITY
Start: 2021-10-21 | End: 2023-10-18

## 2023-09-03 RX ORDER — AMLODIPINE BESYLATE 10 MG/1
10 TABLET ORAL
COMMUNITY
Start: 2021-10-15 | End: 2023-10-18 | Stop reason: SDUPTHER

## 2023-09-03 RX ORDER — DORAVIRINE 100 MG/1
1 TABLET, FILM COATED ORAL
COMMUNITY
Start: 2021-10-21 | End: 2024-02-08

## 2023-09-03 RX ORDER — UBIDECARENONE 75 MG
CAPSULE ORAL
COMMUNITY
Start: 2021-08-12

## 2023-09-03 RX ORDER — VIT C/E/ZN/COPPR/LUTEIN/ZEAXAN 250MG-90MG
CAPSULE ORAL
COMMUNITY
End: 2023-10-18 | Stop reason: SDUPTHER

## 2023-09-03 RX ORDER — EMTRICITABINE 200 MG/1
200 CAPSULE ORAL
COMMUNITY
Start: 2022-09-14 | End: 2023-10-18 | Stop reason: SDUPTHER

## 2023-09-03 RX ORDER — OMEPRAZOLE 40 MG/1
1 CAPSULE, DELAYED RELEASE ORAL 2 TIMES DAILY
COMMUNITY
Start: 2018-05-01 | End: 2023-10-10 | Stop reason: WASHOUT

## 2023-09-03 RX ORDER — TRAZODONE HYDROCHLORIDE 50 MG/1
50 TABLET ORAL AS NEEDED
COMMUNITY
End: 2023-10-10 | Stop reason: WASHOUT

## 2023-09-03 RX ORDER — DOCUSATE SODIUM 100 MG/1
1 CAPSULE, LIQUID FILLED ORAL 2 TIMES DAILY
COMMUNITY
Start: 2014-04-09 | End: 2023-10-10 | Stop reason: WASHOUT

## 2023-09-03 RX ORDER — DESVENLAFAXINE 50 MG/1
1 TABLET, EXTENDED RELEASE ORAL DAILY
COMMUNITY
End: 2023-10-10 | Stop reason: WASHOUT

## 2023-09-03 RX ORDER — ACETAMINOPHEN 500 MG
500 TABLET ORAL 3 TIMES DAILY PRN
COMMUNITY
Start: 2021-05-19

## 2023-09-05 LAB
ALANINE AMINOTRANSFERASE (SGPT) (U/L) IN SER/PLAS: 3 U/L (ref 7–45)
ALBUMIN (G/DL) IN SER/PLAS: 2.2 G/DL (ref 3.4–5)
ALKALINE PHOSPHATASE (U/L) IN SER/PLAS: 190 U/L (ref 33–110)
ANION GAP IN SER/PLAS: 9 MMOL/L (ref 10–20)
ASPARTATE AMINOTRANSFERASE (SGOT) (U/L) IN SER/PLAS: 19 U/L (ref 9–39)
BASOPHILS (10*3/UL) IN BLOOD BY AUTOMATED COUNT: 0.01 X10E9/L (ref 0–0.1)
BASOPHILS/100 LEUKOCYTES IN BLOOD BY AUTOMATED COUNT: 0.2 % (ref 0–2)
BILIRUBIN TOTAL (MG/DL) IN SER/PLAS: 0.2 MG/DL (ref 0–1.2)
CALCIUM (MG/DL) IN SER/PLAS: 7.5 MG/DL (ref 8.6–10.3)
CARBON DIOXIDE, TOTAL (MMOL/L) IN SER/PLAS: 19 MMOL/L (ref 21–32)
CHLORIDE (MMOL/L) IN SER/PLAS: 114 MMOL/L (ref 98–107)
CREATINE KINASE (U/L) IN SER/PLAS: 38 U/L (ref 0–215)
CREATININE (MG/DL) IN SER/PLAS: 1.05 MG/DL (ref 0.5–1.05)
EOSINOPHILS (10*3/UL) IN BLOOD BY AUTOMATED COUNT: 0.09 X10E9/L (ref 0–0.7)
EOSINOPHILS/100 LEUKOCYTES IN BLOOD BY AUTOMATED COUNT: 1.9 % (ref 0–6)
ERYTHROCYTE DISTRIBUTION WIDTH (RATIO) BY AUTOMATED COUNT: 16.7 % (ref 11.5–14.5)
ERYTHROCYTE MEAN CORPUSCULAR HEMOGLOBIN CONCENTRATION (G/DL) BY AUTOMATED: 30.8 G/DL (ref 32–36)
ERYTHROCYTE MEAN CORPUSCULAR VOLUME (FL) BY AUTOMATED COUNT: 89 FL (ref 80–100)
ERYTHROCYTES (10*6/UL) IN BLOOD BY AUTOMATED COUNT: 3.27 X10E12/L (ref 4–5.2)
GFR FEMALE: 66 ML/MIN/1.73M2
GLUCOSE (MG/DL) IN SER/PLAS: 69 MG/DL (ref 74–99)
HEMATOCRIT (%) IN BLOOD BY AUTOMATED COUNT: 29.2 % (ref 36–46)
HEMOGLOBIN (G/DL) IN BLOOD: 9 G/DL (ref 12–16)
IMMATURE GRANULOCYTES/100 LEUKOCYTES IN BLOOD BY AUTOMATED COUNT: 0.2 % (ref 0–0.9)
LEUKOCYTES (10*3/UL) IN BLOOD BY AUTOMATED COUNT: 4.8 X10E9/L (ref 4.4–11.3)
LYMPHOCYTES (10*3/UL) IN BLOOD BY AUTOMATED COUNT: 1.83 X10E9/L (ref 1.2–4.8)
LYMPHOCYTES/100 LEUKOCYTES IN BLOOD BY AUTOMATED COUNT: 38.4 % (ref 13–44)
MAGNESIUM (MG/DL) IN SER/PLAS: 1.7 MG/DL (ref 1.6–2.4)
MONOCYTES (10*3/UL) IN BLOOD BY AUTOMATED COUNT: 0.4 X10E9/L (ref 0.1–1)
MONOCYTES/100 LEUKOCYTES IN BLOOD BY AUTOMATED COUNT: 8.4 % (ref 2–10)
NEUTROPHILS (10*3/UL) IN BLOOD BY AUTOMATED COUNT: 2.43 X10E9/L (ref 1.2–7.7)
NEUTROPHILS/100 LEUKOCYTES IN BLOOD BY AUTOMATED COUNT: 50.9 % (ref 40–80)
PHOSPHATE (MG/DL) IN SER/PLAS: 3.4 MG/DL (ref 2.5–4.9)
PLATELETS (10*3/UL) IN BLOOD AUTOMATED COUNT: 248 X10E9/L (ref 150–450)
POTASSIUM (MMOL/L) IN SER/PLAS: 4.4 MMOL/L (ref 3.5–5.3)
PROTEIN TOTAL: 6.8 G/DL (ref 6.4–8.2)
SODIUM (MMOL/L) IN SER/PLAS: 138 MMOL/L (ref 136–145)
TRIGLYCERIDE (MG/DL) IN SER/PLAS: 87 MG/DL (ref 0–149)
UREA NITROGEN (MG/DL) IN SER/PLAS: 15 MG/DL (ref 6–23)

## 2023-09-12 LAB
ALANINE AMINOTRANSFERASE (SGPT) (U/L) IN SER/PLAS: 7 U/L (ref 7–45)
ALBUMIN (G/DL) IN SER/PLAS: 2.3 G/DL (ref 3.4–5)
ALKALINE PHOSPHATASE (U/L) IN SER/PLAS: 215 U/L (ref 33–110)
ANION GAP IN SER/PLAS: 7 MMOL/L (ref 10–20)
ASPARTATE AMINOTRANSFERASE (SGOT) (U/L) IN SER/PLAS: 16 U/L (ref 9–39)
BASOPHILS (10*3/UL) IN BLOOD BY AUTOMATED COUNT: 0.01 X10E9/L (ref 0–0.1)
BASOPHILS/100 LEUKOCYTES IN BLOOD BY AUTOMATED COUNT: 0.2 % (ref 0–2)
BILIRUBIN TOTAL (MG/DL) IN SER/PLAS: 0.2 MG/DL (ref 0–1.2)
CALCIUM (MG/DL) IN SER/PLAS: 7.5 MG/DL (ref 8.6–10.3)
CARBON DIOXIDE, TOTAL (MMOL/L) IN SER/PLAS: 21 MMOL/L (ref 21–32)
CHLORIDE (MMOL/L) IN SER/PLAS: 115 MMOL/L (ref 98–107)
CREATINE KINASE (U/L) IN SER/PLAS: 44 U/L (ref 0–215)
CREATININE (MG/DL) IN SER/PLAS: 1.21 MG/DL (ref 0.5–1.05)
EOSINOPHILS (10*3/UL) IN BLOOD BY AUTOMATED COUNT: 0.05 X10E9/L (ref 0–0.7)
EOSINOPHILS/100 LEUKOCYTES IN BLOOD BY AUTOMATED COUNT: 1.1 % (ref 0–6)
ERYTHROCYTE DISTRIBUTION WIDTH (RATIO) BY AUTOMATED COUNT: 16.3 % (ref 11.5–14.5)
ERYTHROCYTE MEAN CORPUSCULAR HEMOGLOBIN CONCENTRATION (G/DL) BY AUTOMATED: 31 G/DL (ref 32–36)
ERYTHROCYTE MEAN CORPUSCULAR VOLUME (FL) BY AUTOMATED COUNT: 88 FL (ref 80–100)
ERYTHROCYTES (10*6/UL) IN BLOOD BY AUTOMATED COUNT: 3.4 X10E12/L (ref 4–5.2)
GFR FEMALE: 56 ML/MIN/1.73M2
GLUCOSE (MG/DL) IN SER/PLAS: 97 MG/DL (ref 74–99)
HEMATOCRIT (%) IN BLOOD BY AUTOMATED COUNT: 30 % (ref 36–46)
HEMOGLOBIN (G/DL) IN BLOOD: 9.3 G/DL (ref 12–16)
IMMATURE GRANULOCYTES/100 LEUKOCYTES IN BLOOD BY AUTOMATED COUNT: 0 % (ref 0–0.9)
LEUKOCYTES (10*3/UL) IN BLOOD BY AUTOMATED COUNT: 4.4 X10E9/L (ref 4.4–11.3)
LYMPHOCYTES (10*3/UL) IN BLOOD BY AUTOMATED COUNT: 1.62 X10E9/L (ref 1.2–4.8)
LYMPHOCYTES/100 LEUKOCYTES IN BLOOD BY AUTOMATED COUNT: 37.1 % (ref 13–44)
MAGNESIUM (MG/DL) IN SER/PLAS: 1.8 MG/DL (ref 1.6–2.4)
MONOCYTES (10*3/UL) IN BLOOD BY AUTOMATED COUNT: 0.3 X10E9/L (ref 0.1–1)
MONOCYTES/100 LEUKOCYTES IN BLOOD BY AUTOMATED COUNT: 6.9 % (ref 2–10)
NEUTROPHILS (10*3/UL) IN BLOOD BY AUTOMATED COUNT: 2.39 X10E9/L (ref 1.2–7.7)
NEUTROPHILS/100 LEUKOCYTES IN BLOOD BY AUTOMATED COUNT: 54.7 % (ref 40–80)
PHOSPHATE (MG/DL) IN SER/PLAS: 3.3 MG/DL (ref 2.5–4.9)
PLATELETS (10*3/UL) IN BLOOD AUTOMATED COUNT: 251 X10E9/L (ref 150–450)
POTASSIUM (MMOL/L) IN SER/PLAS: 4 MMOL/L (ref 3.5–5.3)
PROTEIN TOTAL: 7.1 G/DL (ref 6.4–8.2)
SODIUM (MMOL/L) IN SER/PLAS: 139 MMOL/L (ref 136–145)
TRIGLYCERIDE (MG/DL) IN SER/PLAS: 69 MG/DL (ref 0–149)
UREA NITROGEN (MG/DL) IN SER/PLAS: 16 MG/DL (ref 6–23)

## 2023-09-25 LAB
ALANINE AMINOTRANSFERASE (SGPT) (U/L) IN SER/PLAS: 11 U/L (ref 7–45)
ALBUMIN (G/DL) IN SER/PLAS: 2.2 G/DL (ref 3.4–5)
ALKALINE PHOSPHATASE (U/L) IN SER/PLAS: 269 U/L (ref 33–110)
ANION GAP IN SER/PLAS: 10 MMOL/L (ref 10–20)
ASPARTATE AMINOTRANSFERASE (SGOT) (U/L) IN SER/PLAS: 24 U/L (ref 9–39)
BASOPHILS (10*3/UL) IN BLOOD BY AUTOMATED COUNT: 0.01 X10E9/L (ref 0–0.1)
BASOPHILS/100 LEUKOCYTES IN BLOOD BY AUTOMATED COUNT: 0.2 % (ref 0–2)
BILIRUBIN TOTAL (MG/DL) IN SER/PLAS: 0.2 MG/DL (ref 0–1.2)
CALCIUM (MG/DL) IN SER/PLAS: 7.3 MG/DL (ref 8.6–10.3)
CARBON DIOXIDE, TOTAL (MMOL/L) IN SER/PLAS: 21 MMOL/L (ref 21–32)
CHLORIDE (MMOL/L) IN SER/PLAS: 112 MMOL/L (ref 98–107)
CREATINE KINASE (U/L) IN SER/PLAS: 41 U/L (ref 0–215)
CREATININE (MG/DL) IN SER/PLAS: 1.16 MG/DL (ref 0.5–1.05)
EOSINOPHILS (10*3/UL) IN BLOOD BY AUTOMATED COUNT: 0.05 X10E9/L (ref 0–0.7)
EOSINOPHILS/100 LEUKOCYTES IN BLOOD BY AUTOMATED COUNT: 1.1 % (ref 0–6)
ERYTHROCYTE DISTRIBUTION WIDTH (RATIO) BY AUTOMATED COUNT: 17 % (ref 11.5–14.5)
ERYTHROCYTE MEAN CORPUSCULAR HEMOGLOBIN CONCENTRATION (G/DL) BY AUTOMATED: 31.7 G/DL (ref 32–36)
ERYTHROCYTE MEAN CORPUSCULAR VOLUME (FL) BY AUTOMATED COUNT: 85 FL (ref 80–100)
ERYTHROCYTES (10*6/UL) IN BLOOD BY AUTOMATED COUNT: 3.42 X10E12/L (ref 4–5.2)
GFR FEMALE: 58 ML/MIN/1.73M2
GLUCOSE (MG/DL) IN SER/PLAS: 93 MG/DL (ref 74–99)
HEMATOCRIT (%) IN BLOOD BY AUTOMATED COUNT: 29 % (ref 36–46)
HEMOGLOBIN (G/DL) IN BLOOD: 9.2 G/DL (ref 12–16)
IMMATURE GRANULOCYTES/100 LEUKOCYTES IN BLOOD BY AUTOMATED COUNT: 0.2 % (ref 0–0.9)
LEUKOCYTES (10*3/UL) IN BLOOD BY AUTOMATED COUNT: 4.7 X10E9/L (ref 4.4–11.3)
LYMPHOCYTES (10*3/UL) IN BLOOD BY AUTOMATED COUNT: 1.64 X10E9/L (ref 1.2–4.8)
LYMPHOCYTES/100 LEUKOCYTES IN BLOOD BY AUTOMATED COUNT: 35 % (ref 13–44)
MAGNESIUM (MG/DL) IN SER/PLAS: 1.9 MG/DL (ref 1.6–2.4)
MONOCYTES (10*3/UL) IN BLOOD BY AUTOMATED COUNT: 0.34 X10E9/L (ref 0.1–1)
MONOCYTES/100 LEUKOCYTES IN BLOOD BY AUTOMATED COUNT: 7.2 % (ref 2–10)
NEUTROPHILS (10*3/UL) IN BLOOD BY AUTOMATED COUNT: 2.64 X10E9/L (ref 1.2–7.7)
NEUTROPHILS/100 LEUKOCYTES IN BLOOD BY AUTOMATED COUNT: 56.3 % (ref 40–80)
PHOSPHATE (MG/DL) IN SER/PLAS: 2.8 MG/DL (ref 2.5–4.9)
PLATELETS (10*3/UL) IN BLOOD AUTOMATED COUNT: 292 X10E9/L (ref 150–450)
POTASSIUM (MMOL/L) IN SER/PLAS: 3.9 MMOL/L (ref 3.5–5.3)
PROTEIN TOTAL: 7 G/DL (ref 6.4–8.2)
SODIUM (MMOL/L) IN SER/PLAS: 139 MMOL/L (ref 136–145)
TRIGLYCERIDE (MG/DL) IN SER/PLAS: 73 MG/DL (ref 0–149)
UREA NITROGEN (MG/DL) IN SER/PLAS: 12 MG/DL (ref 6–23)

## 2023-10-06 ENCOUNTER — TELEPHONE (OUTPATIENT)
Dept: IMMUNOLOGY | Facility: CLINIC | Age: 48
End: 2023-10-06
Payer: COMMERCIAL

## 2023-10-10 ENCOUNTER — OFFICE VISIT (OUTPATIENT)
Dept: OPHTHALMOLOGY | Facility: CLINIC | Age: 48
End: 2023-10-10
Payer: COMMERCIAL

## 2023-10-10 DIAGNOSIS — H31.002 RETINAL SCAR OF LEFT EYE: ICD-10-CM

## 2023-10-10 DIAGNOSIS — H35.413 LATTICE DEGENERATION OF BOTH RETINAS: Primary | ICD-10-CM

## 2023-10-10 DIAGNOSIS — H52.4 PRESBYOPIA OF BOTH EYES: ICD-10-CM

## 2023-10-10 PROCEDURE — 99214 OFFICE O/P EST MOD 30 MIN: CPT | Performed by: OPTOMETRIST

## 2023-10-10 PROCEDURE — 92015 DETERMINE REFRACTIVE STATE: CPT | Performed by: OPTOMETRIST

## 2023-10-10 ASSESSMENT — REFRACTION_MANIFEST
OD_SPHERE: +0.25
OD_CYLINDER: +0.25
OS_AXIS: 067
OS_CYLINDER: +0.50
METHOD_AUTOREFRACTION: 1
OD_AXIS: 086
OS_ADD: +1.50
OS_SPHERE: PLANO
OD_ADD: +1.50

## 2023-10-10 ASSESSMENT — VISUAL ACUITY
OS_CC: 20/25
OD_SC+: +1
METHOD: SNELLEN - LINEAR
OD_SC: 20/20
OD_CC: 20/25
OS_SC: 20/20

## 2023-10-10 ASSESSMENT — EXTERNAL EXAM - LEFT EYE: OS_EXAM: NORMAL

## 2023-10-10 ASSESSMENT — CUP TO DISC RATIO
OS_RATIO: 0.10
OD_RATIO: 0.10

## 2023-10-10 ASSESSMENT — CONF VISUAL FIELD
OS_NORMAL: 1
OD_INFERIOR_TEMPORAL_RESTRICTION: 0
OS_INFERIOR_TEMPORAL_RESTRICTION: 0
OD_SUPERIOR_NASAL_RESTRICTION: 0
OS_SUPERIOR_NASAL_RESTRICTION: 0
OS_INFERIOR_NASAL_RESTRICTION: 0
OD_NORMAL: 1
OS_SUPERIOR_TEMPORAL_RESTRICTION: 0
OD_INFERIOR_NASAL_RESTRICTION: 0
OD_SUPERIOR_TEMPORAL_RESTRICTION: 0

## 2023-10-10 ASSESSMENT — REFRACTION_WEARINGRX
OD_SPHERE: +2.00
OS_SPHERE: +2.00

## 2023-10-10 ASSESSMENT — TONOMETRY
IOP_METHOD: GOLDMANN APPLANATION
OD_IOP_MMHG: 13
OS_IOP_MMHG: 15

## 2023-10-10 ASSESSMENT — SLIT LAMP EXAM - LIDS
COMMENTS: NORMAL
COMMENTS: NORMAL

## 2023-10-10 ASSESSMENT — ENCOUNTER SYMPTOMS
PSYCHIATRIC NEGATIVE: 0
NEUROLOGICAL NEGATIVE: 0
CONSTITUTIONAL NEGATIVE: 0
GASTROINTESTINAL NEGATIVE: 0
EYES NEGATIVE: 0
HEMATOLOGIC/LYMPHATIC NEGATIVE: 0
ALLERGIC/IMMUNOLOGIC NEGATIVE: 0
ENDOCRINE NEGATIVE: 0
CARDIOVASCULAR NEGATIVE: 0
RESPIRATORY NEGATIVE: 0
MUSCULOSKELETAL NEGATIVE: 0

## 2023-10-10 ASSESSMENT — EXTERNAL EXAM - RIGHT EYE: OD_EXAM: NORMAL

## 2023-10-10 NOTE — PROGRESS NOTES
Assessment/Plan   Diagnoses and all orders for this visit:  Lattice degeneration of both retinas  Retinal scar of left eye  Presbyopia of both eyes  Established patient, new to provider, stable presbyopic refractive error, issued spec rx for reading or full-time wear. Stable lattice/retinal scar OS without holes/tears/detachments both eyes (OU). Ocular structures and alignment otherwise normal. RTC 1yr or sooner with changes in vision.

## 2023-10-18 DIAGNOSIS — Z21 ASYMPTOMATIC HIV INFECTION, WITH NO HISTORY OF HIV-RELATED ILLNESS (MULTI): Chronic | ICD-10-CM

## 2023-10-18 DIAGNOSIS — D50.9 IRON DEFICIENCY ANEMIA, UNSPECIFIED IRON DEFICIENCY ANEMIA TYPE: ICD-10-CM

## 2023-10-18 DIAGNOSIS — I10 HYPERTENSION, UNSPECIFIED TYPE: ICD-10-CM

## 2023-10-18 DIAGNOSIS — E55.9 VITAMIN D DEFICIENCY: ICD-10-CM

## 2023-10-18 RX ORDER — EMTRICITABINE 200 MG/1
200 CAPSULE ORAL DAILY
Qty: 30 CAPSULE | Refills: 5 | Status: SHIPPED | OUTPATIENT
Start: 2023-10-18 | End: 2024-04-15

## 2023-10-18 RX ORDER — FERROUS SULFATE 325(65) MG
325 TABLET ORAL
Qty: 90 TABLET | Refills: 2 | Status: SHIPPED | OUTPATIENT
Start: 2023-10-18 | End: 2024-01-09 | Stop reason: SDUPTHER

## 2023-10-18 RX ORDER — HYDROCHLOROTHIAZIDE 12.5 MG/1
12.5 CAPSULE ORAL EVERY MORNING
Qty: 30 CAPSULE | Refills: 5 | Status: SHIPPED | OUTPATIENT
Start: 2023-10-18 | End: 2024-04-15 | Stop reason: SDUPTHER

## 2023-10-18 RX ORDER — DOLUTEGRAVIR SODIUM 50 MG/1
50 TABLET, FILM COATED ORAL DAILY
Qty: 30 TABLET | OUTPATIENT
Start: 2023-10-18

## 2023-10-18 RX ORDER — AMLODIPINE BESYLATE 10 MG/1
10 TABLET ORAL DAILY
Qty: 90 TABLET | OUTPATIENT
Start: 2023-10-18

## 2023-10-18 RX ORDER — HYDROCHLOROTHIAZIDE 12.5 MG/1
12.5 CAPSULE ORAL DAILY
Qty: 30 CAPSULE | OUTPATIENT
Start: 2023-10-18

## 2023-10-18 RX ORDER — FERROUS SULFATE TAB 325 MG (65 MG ELEMENTAL FE) 325 (65 FE) MG
TAB ORAL
Qty: 270 TABLET | OUTPATIENT
Start: 2023-10-18

## 2023-10-18 RX ORDER — AMLODIPINE BESYLATE 10 MG/1
10 TABLET ORAL
Qty: 30 TABLET | Refills: 5 | Status: SHIPPED | OUTPATIENT
Start: 2023-10-18 | End: 2024-04-15

## 2023-10-18 RX ORDER — EMTRICITABINE 200 MG/1
CAPSULE ORAL
Qty: 30 CAPSULE | OUTPATIENT
Start: 2023-10-18

## 2023-10-18 RX ORDER — VIT C/E/ZN/COPPR/LUTEIN/ZEAXAN 250MG-90MG
50 CAPSULE ORAL DAILY
Qty: 60 CAPSULE | Refills: 5 | Status: SHIPPED | OUTPATIENT
Start: 2023-10-18 | End: 2024-02-15 | Stop reason: ALTCHOICE

## 2023-10-20 RX ORDER — ERGOCALCIFEROL 1.25 MG/1
1 CAPSULE ORAL
Qty: 12 CAPSULE | OUTPATIENT
Start: 2023-10-20

## 2023-10-30 RX ORDER — TENOFOVIR ALAFENAMIDE 25 MG/1
TABLET ORAL
COMMUNITY
Start: 2023-10-16 | End: 2023-11-14

## 2023-10-31 ENCOUNTER — OFFICE VISIT (OUTPATIENT)
Dept: IMMUNOLOGY | Facility: CLINIC | Age: 48
End: 2023-10-31
Payer: COMMERCIAL

## 2023-10-31 VITALS
SYSTOLIC BLOOD PRESSURE: 137 MMHG | WEIGHT: 146.8 LBS | HEIGHT: 62 IN | BODY MASS INDEX: 27.02 KG/M2 | TEMPERATURE: 97 F | OXYGEN SATURATION: 85 % | RESPIRATION RATE: 16 BRPM | DIASTOLIC BLOOD PRESSURE: 77 MMHG | HEART RATE: 74 BPM

## 2023-10-31 DIAGNOSIS — I26.99 OTHER ACUTE PULMONARY EMBOLISM WITHOUT ACUTE COR PULMONALE (MULTI): ICD-10-CM

## 2023-10-31 DIAGNOSIS — D50.9 IRON DEFICIENCY ANEMIA, UNSPECIFIED IRON DEFICIENCY ANEMIA TYPE: ICD-10-CM

## 2023-10-31 DIAGNOSIS — B20 SYMPTOMATIC HIV INFECTION (MULTI): Primary | ICD-10-CM

## 2023-10-31 DIAGNOSIS — E55.9 VITAMIN D DEFICIENCY: ICD-10-CM

## 2023-10-31 DIAGNOSIS — K63.2 ENTEROCUTANEOUS FISTULA: ICD-10-CM

## 2023-10-31 PROCEDURE — 99215 OFFICE O/P EST HI 40 MIN: CPT | Performed by: INTERNAL MEDICINE

## 2023-10-31 PROCEDURE — 3075F SYST BP GE 130 - 139MM HG: CPT | Performed by: INTERNAL MEDICINE

## 2023-10-31 PROCEDURE — 3078F DIAST BP <80 MM HG: CPT | Performed by: INTERNAL MEDICINE

## 2023-10-31 PROCEDURE — 1036F TOBACCO NON-USER: CPT | Performed by: INTERNAL MEDICINE

## 2023-10-31 PROCEDURE — 3008F BODY MASS INDEX DOCD: CPT | Performed by: INTERNAL MEDICINE

## 2023-10-31 RX ORDER — MIRTAZAPINE 15 MG/1
TABLET, FILM COATED ORAL
COMMUNITY
Start: 2023-10-30

## 2023-10-31 ASSESSMENT — PAIN SCALES - GENERAL: PAINLEVEL: 10-WORST PAIN EVER

## 2023-10-31 NOTE — PROGRESS NOTES
Subjective   Veena Garza is a 48 y.o. female who presents to the [facility] for follow-up of HIV infection.     Recently admitted for septic shock d/t Klebsiella from ECF; dc'd on 8/18 with dapto/ancef and TPN via tunneled RIJ. Abx finished last month. Surgery with no plans yet for closure of fistula.     1. HIV, improving   -5/2023: CD4 245 and VL 8,180  - after long discussion last visit on DTG tablet, TAF tablet and FTC capsule-open into applesauce.And she is tolerating!! No n/v     2. Chronic enterocutaneous fistula c/b Klebsiella bacteremia  -surgeons dont want to risk it  -still leaking when she eats or drinks  -TPN, had last bag yesterday, was supposed to continue she thinks   [ ] new surgery appt     3. Recurrent DVT/PE  -Follows with vasc medicine  -Cont apixaban indefinitely for recurrent unprovoked DVT/PE  [ ] refill   [ ] vasc med    4. Renal mass c/f RCC  -urology follow-up     5.Psychiatric disorder   -No longer on bupropion or aripiprazole  -Saw psych at [facility] yesterday and prescribed something for sleep she says. Still has  there but shesays no therapist  -flagged SI on the PRO survey but she says would never do it, has the kids, no thoughts of a plan. Just feels overhwhelmed    6. HTN - c/w amlodipine and HCTZ     7. Health maintenance  - discussed flu shot and COVID booster-says will go  -not sexually active, no sti screen  - referred to dental last visit      Objective   Vitals:    10/31/23 1400   BP: 137/77   Pulse: 74   Resp: 16   Temp: 36.1 °C (97 °F)   SpO2: (!) 85%        Current Outpatient Medications:     acetaminophen (Tylenol) 500 mg tablet, Take 1 tablet (500 mg) by mouth 3 times a day as needed., Disp: , Rfl:     albuterol 90 mcg/actuation inhaler, Inhale 1-2 puffs every 6 hours if needed., Disp: , Rfl:     amLODIPine (Norvasc) 10 mg tablet, Take 1 tablet (10 mg) by mouth once daily., Disp: 30 tablet, Rfl: 5    apixaban (Eliquis) 5 mg tablet, TAKE 1 TABLET BY MOUTH EVERY  12 HOURS., Disp: 60 tablet, Rfl: 0    brexpiprazole 3 mg tablet, Take 3 mg by mouth., Disp: , Rfl:     desvenlafaxine (Pristiq) 50 mg 24 hr tablet, TAKE 1 TABLET BY MOUTH AT BEDTIME, Disp: 30 tablet, Rfl: 0    dolutegravir (Tivicay) 50 mg tablet, Take 1 tablet (50 mg) by mouth once daily with a meal., Disp: 30 tablet, Rfl: 5    doravirine (Pifeltro) 100 mg tablet tablet, Take 1 tablet (100 mg) by mouth once daily., Disp: , Rfl:     ferrous sulfate 325 (65 Fe) MG tablet, Take 1 tablet (325 mg) by mouth 3 times a day with meals., Disp: 90 tablet, Rfl: 2    hydroCHLOROthiazide (Microzide) 12.5 mg capsule, Take 1 capsule (12.5 mg) by mouth once daily in the morning., Disp: 30 capsule, Rfl: 5    mirtazapine (Remeron) 15 mg tablet, , Disp: , Rfl:     polyethylene glycol (Glycolax, Miralax) 17 gram/dose powder, MIX ONE CAPFUL (17 GRAMS) IN 8 OUNCES OF LIQUID AND DRINK BY MOUTH ONCE DAILY, Disp: 510 g, Rfl: 0    sennosides (Senokot) 8.6 mg tablet, TAKE 1 TABLET BY MOUTH TWO TIMES A DAY, Disp: 60 tablet, Rfl: 0    Vemlidy 25 mg tablet tablet, , Disp: , Rfl:     cholecalciferol (Vitamin D-3) 25 MCG (1000 UT) capsule, Take 2 capsules (50 mcg) by mouth once daily., Disp: 60 capsule, Rfl: 5    cyanocobalamin (Vitamin B-12) 500 mcg tablet, Take by mouth., Disp: , Rfl:     emtricitabine (Emtriva) 200 mg capsule, Take 1 capsule (200 mg) by mouth once daily., Disp: 30 capsule, Rfl: 5    multivitamin tablet, TAKE 1 TABLET BY MOUTH ONCE DAILY (Patient not taking: Reported on 10/31/2023), Disp: 30 tablet, Rfl: 0   Physical Exam  Physical Exam  Constitutional:       General: not in acute distress.     Appearance: Normal appearance.   HENT:      Head: Normocephalic and atraumatic.      Pharynx: Oropharynx is clear. No oropharyngeal exudate.   Eyes:      General: No scleral icterus.  Cardiovascular:      Rate and Rhythm: Normal rate and regular rhythm.   Pulmonary:      Breath sounds: Normal breath sounds. No wheezing or rhonchi. R  "subclav area tunnel PICC, no erythema or d/c  Abdominal:      Palpations: Abdomen is soft.      Tenderness: There is no abdominal tenderness.   Has a drain mid abd with enteric contents coming out on all sides, surrounding skin erythematous.   Musculoskeletal:      Cervical back: Neck supple.      Right lower leg: No edema.      Left lower leg: No edema.   Skin:     Findings: No rash.   Neurological:      Mental Status: alert.   Psychiatric:         Mood and Affect: Mood normal.     Laboratory  Lab Results   Component Value Date    UMF6MXYWX 8,180 (A) 05/25/2023    MN6NYY8WMYY 0.245 (L) 05/16/2023    VITD25 16 (A) 07/26/2023      Lab Results   Component Value Date    WBC 4.7 09/25/2023    HGB 9.2 (L) 09/25/2023    HCT 29.0 (L) 09/25/2023    MCV 85 09/25/2023     09/25/2023      Lab Results   Component Value Date    GLUCOSE 93 09/25/2023    CALCIUM 7.3 (L) 09/25/2023     09/25/2023    K 3.9 09/25/2023    CO2 21 09/25/2023     (H) 09/25/2023    BUN 12 09/25/2023    CREATININE 1.16 (H) 09/25/2023      Lab Results   Component Value Date    CHOL 63 09/14/2022    CHOL 115 09/18/2020    CHOL 141 01/29/2019     Lab Results   Component Value Date    HDL 22.3 (A) 09/14/2022    HDL 51.0 09/18/2020    HDL 47.3 01/29/2019     No results found for: \"LDLCALC\"  Lab Results   Component Value Date    TRIG 73 09/25/2023    TRIG 69 09/12/2023    TRIG 87 09/05/2023     No components found for: \"CHOLHDL\"      Assessment/Plan   Problem List Items Addressed This Visit       Enterocutaneous fistula    HIV infection (CMS/HCC) - Primary (Chronic)    Relevant Orders    CD4/8 Panel    CBC and Auto Differential    Comprehensive Metabolic Panel    HIV RNA, quantitative, PCR    Iron deficiency anemia    Relevant Orders    Ferritin    Iron    Vitamin D deficiency    Relevant Orders    Vitamin D 25-Hydroxy,Total (for eval of Vitamin D levels)      Recently admitted for septic shock d/t Klebsiella from ECF; dc'd on 8/18 with " dapto/ancef and TPN via tunneled RIJ. Abx finished last month. Surgery with no plans yet for closure of fistula. She ran out of TPN a month ago and been eating. She has partially digested food coming from her fistula today. Discussed with trauma surgery who feel she would be best served in the ED, but pt cannot go today. She is well-appearing with no signs of active soft tissue infection, but needs urgent follow-up with surgery for TPN and management of ECF.     1. HIV  -5/2023: CD4 245 and VL 8,180  - had trouble tolerating meds, so split up regimen: DTG tablet, TAF tablet and FTC capsule-open into applesauce.   -adherence to her regimen is suboptimal, probably because of low social support and frequent admissions for ECF issues this past year  -she left today before labs could be drawn    2. Chronic enterocutaneous fistula c/b Klebsiella bacteremia  -finished abx, no signs of infection   -still leaking when she eats or drinks  -TPN, had last bag a few weeks ago, should continue, but needs to see surgery   [ ] needs urgent surgery appt     3. Recurrent DVT/PE  -Follows with vasc medicine  -Cont apixaban indefinitely for recurrent unprovoked DVT/PE  -she needs refill and to re-establish care with vasc med    4. Renal mass c/f RCC  -urology follow-up needed, missed last appt    5.Psychiatric disorder   -No longer on bupropion or aripiprazole  -follows with Psych at [facility], jsut saw yesterday. [phone]     6. HTN - c/w amlodipine and HCTZ     7. Health maintenance  - discussed flu shot and COVID booster - refused today  -not sexually active, no sti screen  - referred to dental last visit    Patient seen with Dr. Dony Tapia MD   Resident, PGY-3    Patient seen, examined and discussed with Dr Ribeiro. Edits included and agree with above.  Marjorie Napoles MD

## 2023-11-14 DIAGNOSIS — B20 HUMAN IMMUNODEFICIENCY VIRUS (MULTI): Primary | ICD-10-CM

## 2023-11-14 RX ORDER — TENOFOVIR ALAFENAMIDE 25 MG/1
25 TABLET ORAL DAILY
Qty: 30 TABLET | Refills: 3 | Status: SHIPPED | OUTPATIENT
Start: 2023-11-14 | End: 2024-03-12

## 2023-11-22 ENCOUNTER — CLINICAL SUPPORT (OUTPATIENT)
Dept: SURGERY | Facility: CLINIC | Age: 48
End: 2023-11-22
Payer: COMMERCIAL

## 2023-11-22 ENCOUNTER — HOSPITAL ENCOUNTER (EMERGENCY)
Facility: HOSPITAL | Age: 48
Discharge: HOME | End: 2023-11-22
Attending: EMERGENCY MEDICINE
Payer: COMMERCIAL

## 2023-11-22 VITALS
DIASTOLIC BLOOD PRESSURE: 88 MMHG | SYSTOLIC BLOOD PRESSURE: 122 MMHG | HEIGHT: 64 IN | WEIGHT: 153 LBS | HEART RATE: 77 BPM | BODY MASS INDEX: 26.12 KG/M2

## 2023-11-22 VITALS
WEIGHT: 140 LBS | SYSTOLIC BLOOD PRESSURE: 127 MMHG | RESPIRATION RATE: 18 BRPM | HEIGHT: 64 IN | DIASTOLIC BLOOD PRESSURE: 84 MMHG | BODY MASS INDEX: 23.9 KG/M2 | HEART RATE: 60 BPM | TEMPERATURE: 96.3 F | OXYGEN SATURATION: 97 %

## 2023-11-22 DIAGNOSIS — Z91.199 NONCOMPLIANCE: Primary | ICD-10-CM

## 2023-11-22 DIAGNOSIS — K63.2 ENTEROCUTANEOUS FISTULA: Primary | ICD-10-CM

## 2023-11-22 DIAGNOSIS — K63.2 ENTEROCUTANEOUS FISTULA: ICD-10-CM

## 2023-11-22 DIAGNOSIS — Z71.89 ENCOUNTER FOR OSTOMY CARE EDUCATION: ICD-10-CM

## 2023-11-22 LAB
ALBUMIN SERPL BCP-MCNC: 2.4 G/DL (ref 3.4–5)
ALP SERPL-CCNC: 304 U/L (ref 33–110)
ALT SERPL W P-5'-P-CCNC: 22 U/L (ref 7–45)
ANION GAP SERPL CALC-SCNC: 10 MMOL/L (ref 10–20)
AST SERPL W P-5'-P-CCNC: 31 U/L (ref 9–39)
BASOPHILS # BLD AUTO: 0.03 X10*3/UL (ref 0–0.1)
BASOPHILS NFR BLD AUTO: 0.5 %
BILIRUB SERPL-MCNC: 0.2 MG/DL (ref 0–1.2)
BUN SERPL-MCNC: 11 MG/DL (ref 6–23)
CALCIUM SERPL-MCNC: 8.1 MG/DL (ref 8.6–10.6)
CHLORIDE SERPL-SCNC: 111 MMOL/L (ref 98–107)
CO2 SERPL-SCNC: 23 MMOL/L (ref 21–32)
CREAT SERPL-MCNC: 1.19 MG/DL (ref 0.5–1.05)
EOSINOPHIL # BLD AUTO: 0.24 X10*3/UL (ref 0–0.7)
EOSINOPHIL NFR BLD AUTO: 4.1 %
ERYTHROCYTE [DISTWIDTH] IN BLOOD BY AUTOMATED COUNT: 17.2 % (ref 11.5–14.5)
GFR SERPL CREATININE-BSD FRML MDRD: 57 ML/MIN/1.73M*2
GLUCOSE SERPL-MCNC: 76 MG/DL (ref 74–99)
HCT VFR BLD AUTO: 35.6 % (ref 36–46)
HGB BLD-MCNC: 11.1 G/DL (ref 12–16)
IMM GRANULOCYTES # BLD AUTO: 0.01 X10*3/UL (ref 0–0.7)
IMM GRANULOCYTES NFR BLD AUTO: 0.2 % (ref 0–0.9)
LYMPHOCYTES # BLD AUTO: 2.14 X10*3/UL (ref 1.2–4.8)
LYMPHOCYTES NFR BLD AUTO: 36.4 %
MAGNESIUM SERPL-MCNC: 2.14 MG/DL (ref 1.6–2.4)
MCH RBC QN AUTO: 28.7 PG (ref 26–34)
MCHC RBC AUTO-ENTMCNC: 31.2 G/DL (ref 32–36)
MCV RBC AUTO: 92 FL (ref 80–100)
MONOCYTES # BLD AUTO: 0.42 X10*3/UL (ref 0.1–1)
MONOCYTES NFR BLD AUTO: 7.1 %
NEUTROPHILS # BLD AUTO: 3.04 X10*3/UL (ref 1.2–7.7)
NEUTROPHILS NFR BLD AUTO: 51.7 %
NRBC BLD-RTO: 0.9 /100 WBCS (ref 0–0)
PLATELET # BLD AUTO: 302 X10*3/UL (ref 150–450)
POTASSIUM SERPL-SCNC: 4.6 MMOL/L (ref 3.5–5.3)
PROT SERPL-MCNC: 7.5 G/DL (ref 6.4–8.2)
RBC # BLD AUTO: 3.87 X10*6/UL (ref 4–5.2)
SODIUM SERPL-SCNC: 139 MMOL/L (ref 136–145)
WBC # BLD AUTO: 5.9 X10*3/UL (ref 4.4–11.3)

## 2023-11-22 PROCEDURE — 2500000004 HC RX 250 GENERAL PHARMACY W/ HCPCS (ALT 636 FOR OP/ED): Performed by: PHYSICIAN ASSISTANT

## 2023-11-22 PROCEDURE — 83735 ASSAY OF MAGNESIUM: CPT | Performed by: PHYSICIAN ASSISTANT

## 2023-11-22 PROCEDURE — 99284 EMERGENCY DEPT VISIT MOD MDM: CPT | Performed by: PHYSICIAN ASSISTANT

## 2023-11-22 PROCEDURE — 84075 ASSAY ALKALINE PHOSPHATASE: CPT | Performed by: PHYSICIAN ASSISTANT

## 2023-11-22 PROCEDURE — 96360 HYDRATION IV INFUSION INIT: CPT

## 2023-11-22 PROCEDURE — 99213 OFFICE O/P EST LOW 20 MIN: CPT | Performed by: PHYSICIAN ASSISTANT

## 2023-11-22 PROCEDURE — 99285 EMERGENCY DEPT VISIT HI MDM: CPT | Performed by: EMERGENCY MEDICINE

## 2023-11-22 PROCEDURE — 99284 EMERGENCY DEPT VISIT MOD MDM: CPT | Mod: 25

## 2023-11-22 PROCEDURE — 85025 COMPLETE CBC W/AUTO DIFF WBC: CPT | Performed by: PHYSICIAN ASSISTANT

## 2023-11-22 PROCEDURE — 2550000001 HC RX 255 CONTRASTS: Performed by: PHYSICIAN ASSISTANT

## 2023-11-22 RX ADMIN — SODIUM CHLORIDE, POTASSIUM CHLORIDE, SODIUM LACTATE AND CALCIUM CHLORIDE 1000 ML: 600; 310; 30; 20 INJECTION, SOLUTION INTRAVENOUS at 12:07

## 2023-11-22 RX ADMIN — DIATRIZOATE MEGLUMINE AND DIATRIZOATE SODIUM 60 ML: 660; 100 LIQUID ORAL; RECTAL at 14:16

## 2023-11-22 ASSESSMENT — ENCOUNTER SYMPTOMS
MUSCULOSKELETAL NEGATIVE: 1
CARDIOVASCULAR NEGATIVE: 1
CONSTITUTIONAL NEGATIVE: 1
PSYCHIATRIC NEGATIVE: 1
GASTROINTESTINAL NEGATIVE: 1
ENDOCRINE NEGATIVE: 1
NEUROLOGICAL NEGATIVE: 1
RESPIRATORY NEGATIVE: 1

## 2023-11-22 ASSESSMENT — COLUMBIA-SUICIDE SEVERITY RATING SCALE - C-SSRS
1. IN THE PAST MONTH, HAVE YOU WISHED YOU WERE DEAD OR WISHED YOU COULD GO TO SLEEP AND NOT WAKE UP?: NO
2. HAVE YOU ACTUALLY HAD ANY THOUGHTS OF KILLING YOURSELF?: NO
6. HAVE YOU EVER DONE ANYTHING, STARTED TO DO ANYTHING, OR PREPARED TO DO ANYTHING TO END YOUR LIFE?: NO

## 2023-11-22 ASSESSMENT — PAIN - FUNCTIONAL ASSESSMENT: PAIN_FUNCTIONAL_ASSESSMENT: 0-10

## 2023-11-22 ASSESSMENT — PAIN DESCRIPTION - PROGRESSION: CLINICAL_PROGRESSION: NOT CHANGED

## 2023-11-22 ASSESSMENT — PAIN SCALES - GENERAL: PAINLEVEL_OUTOF10: 0 - NO PAIN

## 2023-11-22 NOTE — ED PROVIDER NOTES
HPI:  48-year-old female with history of HIV, CKD, asthma, GERD, JASMIN, HBV, gastric bypass 2016 complicated by abdominal fistula and necrotizing soft tissue infection of the abdominal wall, DVT/PE, presents from outpatient surgery clinic with concerns for her known abdominal wall fistula.  States that she has been eating orally for the past few months.  Has not had TPN for the past few months.  States that generally when she eats the food comes directly out of her fistula.  She normally Clexane the bag and it appears that she thought she was coming just to get supplies.  States there is no real acute change with this.  States she has had as expected soft bowel movements for herself.  States in the past year she has lost 60 to 80 pounds.  States she urinates adequately despite having quite a bit of fluid that comes out of her fistula regularly.  She denies any fevers, chills, night sweats or rigors, no pains, nausea, vomiting.      Physical Exam:   GEN: Vitals noted. NAD  EYES:  EOMs grossly intact, anicteric sclera  WILLIE: Mucosa moist.  NECK: Supple.  CARD: RRR  PULMONARY: Moving air well. Clear all lung fields.  ABDOMEN: Soft, no guarding, no rigidity. Nontender. NABS, fistula in the left upper quadrant close to the umbilicus with mildly tense skin without erythema warmth or tenderness, food contents coming out of fistula with G-tube that appears to be cut, holding on by 1 suture  EXTREMITIES: Full ROM, no pitting edema,   SKIN: Intact, warm and dry  NEURO: Alert and oriented x 3, speech is clear, no obvious deficits noted.       ----------------------------------------------------------------------------------------------------------------------------    MDM:  48-year-old female presenting for fistula concerns.  On exam she is well-appearing clinically.  Vital signs stable.  ABD soft NTTP with her known fistula that does have food contents filling out the cut G-tube that is in place with a suture, there is no signs  of infection at the site of the G-tube.  Will check laboratory work from her PICC line, from CT abdomen pelvis.  Patient seen with ED attending.  Laboratory work was without leukocytosis or significant anemia, normal electrolytes.  Patient was evaluated by acute care surgery and given that she is at her baseline with no signs of any acute infection.  No indication for emergent imaging.  They recommended discharge with ostomy supplies, follow-up with wound care clinic as well as IR to have her port removed as it is not being used currently.  We will try to give her some supplies and she is given the phone number for wound care clinic, IR, surgery for follow-up as needed.  Return precautions reviewed.    No orders to display     Labs Reviewed   CBC WITH AUTO DIFFERENTIAL - Abnormal       Result Value    WBC 5.9      nRBC 0.9 (*)     RBC 3.87 (*)     Hemoglobin 11.1 (*)     Hematocrit 35.6 (*)     MCV 92      MCH 28.7      MCHC 31.2 (*)     RDW 17.2 (*)     Platelets 302      Neutrophils % 51.7      Immature Granulocytes %, Automated 0.2      Lymphocytes % 36.4      Monocytes % 7.1      Eosinophils % 4.1      Basophils % 0.5      Neutrophils Absolute 3.04      Immature Granulocytes Absolute, Automated 0.01      Lymphocytes Absolute 2.14      Monocytes Absolute 0.42      Eosinophils Absolute 0.24      Basophils Absolute 0.03     COMPREHENSIVE METABOLIC PANEL - Abnormal    Glucose 76      Sodium 139      Potassium 4.6      Chloride 111 (*)     Bicarbonate 23      Anion Gap 10      Urea Nitrogen 11      Creatinine 1.19 (*)     eGFR 57 (*)     Calcium 8.1 (*)     Albumin 2.4 (*)     Alkaline Phosphatase 304 (*)     Total Protein 7.5      AST 31      Bilirubin, Total 0.2      ALT 22     MAGNESIUM - Normal    Magnesium 2.14          ----------------------------------------------------------------------------------------------------------------------------    This note was dictated using a speech recognition program.  While  an attempt was made at proof reading to minimize errors, minor errors in transcription may be present call for questions.     Colton Guidry PA-C  11/22/23 1425       Colton Guidry PA-C  11/22/23 1429

## 2023-11-22 NOTE — PROGRESS NOTES
[facility]  TRAUMA CLINIC PROGRESS NOTE    Patient Name: Veena Garza  MRN: [id number]  Admit Date:   : 1975  AGE: 48 y.o.   GENDER: female  ==============================================================================  CHIEF COMPLAINT:   Follow up from  fistula management     OTHER MEDICAL PROBLEMS:  HIV   Bacteremia   CKD  Asthma   GERD  JASMIN  Hep B    INCIDENTAL FINDINGS:  NA    PROCEDURES:  NA    PATHOLOGY:  NA  ==============================================================================  TODAY'S ASSESSMENT AND PLAN OF CARE:  Patient sent to ED for evaluation of cut G tube and ostomy appliance application.       ==============================================================================  HISTORY OF PRESENT ILLNESS  Patient presented to clinic today after being discharged from home care, pharmacy, and home care supply company for noncompliance. Company informed clinic several weeks ago in regards to patient not being able to get a hold of and not showing up for home care visits. Patient has been eating a regular diet at home, not longer on TPN. She has not been taking care of managing her fistula. Today she has gastric contents pouring out of her fistula and a G tube that she cut the end off of.     MEDICAL HISTORY / ROS:  Admission history and ROS reviewed.   Patient denies:  fevers; chills; headache;  dizziness; chest pain; shortness of breath; nausea/vomiting/diarrhea/constipation; new/worsening abdominal pain or numbness/tingling/weakness of extremities.     PHYSICAL EXAM:  GCS 15, A+OX3, RRR, S1, S2, CTA=, no increased WOB. Abd soft, nt, nd. MAEx4, CARLOS 5/5 x4, no extremity edema noted. 2+pp.   Wound location: Large ECF fistula over midline abdomen with gastric contents pouring from wound. What looks like as a cut G tube       LABS:  No results found for this or any previous visit (from the past 24 hour(s)).  MEDICATIONS:  Current Outpatient  Medications   Medication Sig Dispense Refill    acetaminophen (Tylenol) 500 mg tablet Take 1 tablet (500 mg) by mouth 3 times a day as needed.      albuterol 90 mcg/actuation inhaler Inhale 1-2 puffs every 6 hours if needed.      amLODIPine (Norvasc) 10 mg tablet Take 1 tablet (10 mg) by mouth once daily. 30 tablet 5    apixaban (Eliquis) 5 mg tablet TAKE 1 TABLET BY MOUTH EVERY 12 HOURS. 60 tablet 0    brexpiprazole 3 mg tablet Take 3 mg by mouth.      cholecalciferol (Vitamin D-3) 25 MCG (1000 UT) capsule Take 2 capsules (50 mcg) by mouth once daily. 60 capsule 5    cyanocobalamin (Vitamin B-12) 500 mcg tablet Take by mouth.      desvenlafaxine (Pristiq) 50 mg 24 hr tablet TAKE 1 TABLET BY MOUTH AT BEDTIME 30 tablet 0    dolutegravir (Tivicay) 50 mg tablet Take 1 tablet (50 mg) by mouth once daily with a meal. 30 tablet 5    emtricitabine (Emtriva) 200 mg capsule Take 1 capsule (200 mg) by mouth once daily. 30 capsule 5    ferrous sulfate 325 (65 Fe) MG tablet Take 1 tablet (325 mg) by mouth 3 times a day with meals. 90 tablet 2    hydroCHLOROthiazide (Microzide) 12.5 mg capsule Take 1 capsule (12.5 mg) by mouth once daily in the morning. 30 capsule 5    mirtazapine (Remeron) 15 mg tablet       multivitamin tablet TAKE 1 TABLET BY MOUTH ONCE DAILY 30 tablet 0    polyethylene glycol (Glycolax, Miralax) 17 gram/dose powder MIX ONE CAPFUL (17 GRAMS) IN 8 OUNCES OF LIQUID AND DRINK BY MOUTH ONCE DAILY 510 g 0    Vemlidy 25 mg tablet tablet TAKE 1 TABLET BY MOUTH DAILY 30 tablet 3    doravirine (Pifeltro) 100 mg tablet tablet Take 1 tablet (100 mg) by mouth once daily.      sennosides (Senokot) 8.6 mg tablet TAKE 1 TABLET BY MOUTH TWO TIMES A DAY 60 tablet 0     No current facility-administered medications for this visit.       IMAGING SUMMARY:  (summary of new imaging findings, not a copy of dictation)      I have reviewed all laboratory and imaging results ordered/pertinent for today's encounter.    Gloria Higginbotham,  ALAYNA

## 2023-11-22 NOTE — CONSULTS
[facility]  ACUTE CARE SURGERY - HISTORY AND PHYSICAL / CONSULT    Patient Name: Veena Garza  MRN: [id number]  Admit Date: 1122  : 1975  AGE: 48 y.o.   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 Patient is afebrile, vss, no leukocytosis and non toxic appearing.    Recs:  - No indication for surgical admission  - Wound Care consult to assist with pouching drain/ECF and provide supplies  - Patient to be established with outpatient wound care clinic for further management of fistula.  - Consider removal of tunneled line as patient is no longer using.   - Patient to follow up in Surgery Clinic in 2-3 months.   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 Patient states she has not been seen by Home Care due to loss of communication and is now out of supplies. Patient denies any fever, chills, chest pain, SOB, abdominal pain or N/V. States she has been eating well and gaining weight. Patient endorses regular bowel function.     PAST MEDICAL HISTORY:   Past Medical History:   Diagnosis Date    Age-related nuclear cataract, bilateral 2017    Cataract,  nuclear sclerotic, both eyes    Age-related nuclear cataract, left eye 08/02/2017    Age-related nuclear cataract of left eye    Age-related nuclear cataract, right eye 08/02/2017    Age-related nuclear cataract of right eye    Allergy status to unspecified drugs, medicaments and biological substances 03/02/2017    History of seasonal allergies    Anemia complicating pregnancy, unspecified trimester 08/25/2016    Iron deficiency anemia of pregnancy    Anemia, unspecified 07/21/2013    Anemia    Asymptomatic human immunodeficiency virus (hiv) infection status (CMS/[facility]) 05/12/2016    HIV positive    Bariatric surgery status complicating pregnancy, unspecified trimester 08/25/2016    Pregnancy complicated by previous gastric bypass, antepartum    Cortical age-related cataract, left eye 08/02/2017    Cortical age-related cataract of left eye    Cortical age-related cataract, right eye 08/02/2017    Cortical age-related cataract of right eye    Diaphragmatic hernia without obstruction or gangrene 04/09/2014    Hiatal hernia    Displacement of intrauterine contraceptive device, initial encounter 05/26/2016    Malpositioned IUD    Encounter for gynecological examination (general) (routine) without abnormal findings 08/22/2019    Well woman exam    Essential (primary) hypertension 11/02/2021    Hypertension    Human immunodeficiency virus (HIV) disease complicating pregnancy, second trimester 07/09/2016    HIV disease affecting pregnancy in second trimester    Human immunodeficiency virus (HIV) disease complicating pregnancy, third trimester 08/25/2016    HIV disease affecting pregnancy in third trimester    Irritant contact dermatitis due friction or contact with other specified body fluids 05/26/2016    Drainage from wound    Maternal care for other (suspected) fetal abnormality and damage, fetal cardiac anomalies, not applicable or unspecified 06/24/2016    Abnormal fetal echocardiogram affecting antepartum care of  mother    Morbid (severe) obesity due to excess calories (CMS/[facility]) 2018    Obesity, Class III, BMI 40-49.9 (morbid obesity)    Obesity complicating pregnancy, unspecified trimester 2016    Obesity affecting pregnancy    Obesity, unspecified 2013    Obesity    Other conditions influencing health status 2016    History of pregnancy    Other injury of unspecified body region, initial encounter 2016    Wound infection    Pain in unspecified knee 2016    Joint pain, knee    Personal history of other mental and behavioral disorders 2016    History of depression    Personal history of other specified conditions 2016    History of urinary frequency    Personal history of urinary (tract) infections 2016    History of urinary tract infection    Seborrhea capitis 2014    Dandruff    Supervision of elderly multigravida, third trimester 2016    Supervision of elderly multigravida, third trimester    Supervision of high risk pregnancy, unspecified, unspecified trimester 2016    Pregnancy, high-risk    Supervision of other high risk pregnancies, third trimester 2016    History of  delivery, currently pregnant in third trimester    Threatened  2016    , threatened    Unilateral primary osteoarthritis, unspecified knee 2016    Arthritis of knee    Urinary tract infection, site not specified 2016    UTI (lower urinary tract infection)       PSH:   Past Surgical History:   Procedure Laterality Date    CT GUIDED PERCUTANEOUS PERITONEAL OR RETROPERITONEAL FLUID COLLECTION DRAINAGE  3/31/2023    CT GUIDED PERCUTANEOUS PERITONEAL OR RETROPERITONEAL FLUID COLLECTION DRAINAGE [facility] CT    DILATION AND CURETTAGE OF UTERUS  2014    Dilation And Curettage    EXPLORATORY LAPAROTOMY  2014    Exploratory Laparotomy    GASTRIC BYPASS  2016    Gastric Surgery For Morbid Obesity Bypass With Freddy-en-Y    OTHER SURGICAL  HISTORY  04/07/2014    Ventral Hernia Repair With Implantation Of Mesh    OTHER SURGICAL HISTORY  04/09/2014    Gynecologic Services Intrauterine Device (IUD) Insertion     FH:   Family History   Problem Relation Name Age of Onset    Hypertension Sister      Heart failure Brother      Diabetes Brother      Hypertension Brother      Asthma Son      Seizures Son       SOCIAL HISTORY:    Smoking:   Social History     Tobacco Use   Smoking Status Never   Smokeless Tobacco Never       Alcohol:   Social History     Substance and Sexual Activity   Alcohol Use Not Currently       Drug use: none     MEDICATIONS:   Prior to Admission medications    Medication Sig Start Date End Date Taking? Authorizing Provider   acetaminophen (Tylenol) 500 mg tablet Take 1 tablet (500 mg) by mouth 3 times a day as needed. 5/19/21   Historical Provider, MD   albuterol 90 mcg/actuation inhaler Inhale 1-2 puffs every 6 hours if needed. 4/9/14   Historical Provider, MD   amLODIPine (Norvasc) 10 mg tablet Take 1 tablet (10 mg) by mouth once daily. 10/18/23 4/15/24  Marjorie Napoles MD   apixaban (Eliquis) 5 mg tablet TAKE 1 TABLET BY MOUTH EVERY 12 HOURS. 10/31/23   Don Tapia MD   brexpiprazole 3 mg tablet Take 3 mg by mouth. 11/16/22   Historical Provider, MD   cholecalciferol (Vitamin D-3) 25 MCG (1000 UT) capsule Take 2 capsules (50 mcg) by mouth once daily. 10/18/23 8/13/24  Marjorie Napoles MD   cyanocobalamin (Vitamin B-12) 500 mcg tablet Take by mouth. 8/12/21   Historical Provider, MD   desvenlafaxine (Pristiq) 50 mg 24 hr tablet TAKE 1 TABLET BY MOUTH AT BEDTIME 8/17/23 8/16/24  Nain Reed MD   dolutegravir (Tivicay) 50 mg tablet Take 1 tablet (50 mg) by mouth once daily with a meal. 10/18/23 3/16/24  Marjorie Napoles MD   doravirine (Pifeltro) 100 mg tablet tablet Take 1 tablet (100 mg) by mouth once daily. 10/21/21   Historical Provider, MD   emtricitabine (Emtriva) 200 mg capsule Take 1 capsule (200  "mg) by mouth once daily. 10/18/23 8/13/24  Marjorie Napoles MD   ferrous sulfate 325 (65 Fe) MG tablet Take 1 tablet (325 mg) by mouth 3 times a day with meals. 10/18/23 4/15/24  Marjorie Napoles MD   hydroCHLOROthiazide (Microzide) 12.5 mg capsule Take 1 capsule (12.5 mg) by mouth once daily in the morning. 10/18/23 4/15/24  Marjorie Napoles MD   mirtazapine (Remeron) 15 mg tablet  10/30/23   Historical Provider, MD   multivitamin tablet TAKE 1 TABLET BY MOUTH ONCE DAILY 7/28/23 7/27/24  Jordi Carranza MD   polyethylene glycol (Glycolax, Miralax) 17 gram/dose powder MIX ONE CAPFUL (17 GRAMS) IN 8 OUNCES OF LIQUID AND DRINK BY MOUTH ONCE DAILY 8/17/23 8/16/24  Nain Reed MD   sennosides (Senokot) 8.6 mg tablet TAKE 1 TABLET BY MOUTH TWO TIMES A DAY 8/17/23 8/16/24  Nain Reed MD   Vemlidy 25 mg tablet tablet TAKE 1 TABLET BY MOUTH DAILY 11/14/23   Marjorie Napoles MD     ALLERGIES:   Allergies   Allergen Reactions    Latex Hives and Rash    Morphine Anaphylaxis, Hives, Itching, Other, Shortness of breath and Swelling     Patient reports \"stopped breathing\" and \"throat swelling\" to morphine. She states she has tolerated Percocet in the past.    Shellfish Derived Rash    Vancomycin Hives       REVIEW OF SYSTEMS:  Review of Systems   Constitutional: Negative.    Respiratory: Negative.     Cardiovascular: Negative.    Gastrointestinal: Negative.    Endocrine: Negative.    Genitourinary: Negative.    Musculoskeletal: Negative.    Neurological: Negative.    Psychiatric/Behavioral: Negative.       PHYSICAL EXAM:  Physical Exam  Vitals reviewed.   Constitutional:       Appearance: Normal appearance.   HENT:      Mouth/Throat:      Mouth: Mucous membranes are moist.   Eyes:      Pupils: Pupils are equal, round, and reactive to light.   Cardiovascular:      Rate and Rhythm: Normal rate and regular rhythm.   Pulmonary:      Effort: Pulmonary effort is normal.   Abdominal:      Comments: Soft, non " distended, non tender to palpitation. Midline ECF with drain in place draining digested food and serous drainage. Leora-fistula skin intact with no breakdown.    Skin:     General: Skin is warm and dry.   Neurological:      General: No focal deficit present.      Mental Status: She is alert and oriented to person, place, and time.   Psychiatric:         Behavior: Behavior normal.         Thought Content: Thought content normal.         Cognition and Memory: Cognition normal.       IMAGING SUMMARY:  (summary of findings, not a copy of dictation)  No new imaging     LABS:  Results for orders placed or performed during the hospital encounter of 11/22/23 (from the past 24 hour(s))   CBC and Auto Differential   Result Value Ref Range    WBC 5.9 4.4 - 11.3 x10*3/uL    nRBC 0.9 (H) 0.0 - 0.0 /100 WBCs    RBC 3.87 (L) 4.00 - 5.20 x10*6/uL    Hemoglobin 11.1 (L) 12.0 - 16.0 g/dL    Hematocrit 35.6 (L) 36.0 - 46.0 %    MCV 92 80 - 100 fL    MCH 28.7 26.0 - 34.0 pg    MCHC 31.2 (L) 32.0 - 36.0 g/dL    RDW 17.2 (H) 11.5 - 14.5 %    Platelets 302 150 - 450 x10*3/uL    Neutrophils % 51.7 40.0 - 80.0 %    Immature Granulocytes %, Automated 0.2 0.0 - 0.9 %    Lymphocytes % 36.4 13.0 - 44.0 %    Monocytes % 7.1 2.0 - 10.0 %    Eosinophils % 4.1 0.0 - 6.0 %    Basophils % 0.5 0.0 - 2.0 %    Neutrophils Absolute 3.04 1.20 - 7.70 x10*3/uL    Immature Granulocytes Absolute, Automated 0.01 0.00 - 0.70 x10*3/uL    Lymphocytes Absolute 2.14 1.20 - 4.80 x10*3/uL    Monocytes Absolute 0.42 0.10 - 1.00 x10*3/uL    Eosinophils Absolute 0.24 0.00 - 0.70 x10*3/uL    Basophils Absolute 0.03 0.00 - 0.10 x10*3/uL   Comprehensive metabolic panel   Result Value Ref Range    Glucose 76 74 - 99 mg/dL    Sodium 139 136 - 145 mmol/L    Potassium 4.6 3.5 - 5.3 mmol/L    Chloride 111 (H) 98 - 107 mmol/L    Bicarbonate 23 21 - 32 mmol/L    Anion Gap 10 10 - 20 mmol/L    Urea Nitrogen 11 6 - 23 mg/dL    Creatinine 1.19 (H) 0.50 - 1.05 mg/dL    eGFR 57 (L) >60  mL/min/1.73m*2    Calcium 8.1 (L) 8.6 - 10.6 mg/dL    Albumin 2.4 (L) 3.4 - 5.0 g/dL    Alkaline Phosphatase 304 (H) 33 - 110 U/L    Total Protein 7.5 6.4 - 8.2 g/dL    AST 31 9 - 39 U/L    Bilirubin, Total 0.2 0.0 - 1.2 mg/dL    ALT 22 7 - 45 U/L   Magnesium   Result Value Ref Range    Magnesium 2.14 1.60 - 2.40 mg/dL       I have reviewed all laboratory and imaging results ordered/pertinent for this encounter.

## 2023-11-22 NOTE — DISCHARGE INSTRUCTIONS
Follow-up with the general surgery clinic, they should be in touch with you, if you have not heard from them call [phone].  Follow-up with IR at the numbers are below to have the port removed.  Follow-up with the wound care clinic about your abdominal wall fistula.

## 2023-12-28 ENCOUNTER — TELEPHONE (OUTPATIENT)
Dept: IMMUNOLOGY | Facility: CLINIC | Age: 48
End: 2023-12-28
Payer: COMMERCIAL

## 2024-01-09 DIAGNOSIS — D50.9 IRON DEFICIENCY ANEMIA, UNSPECIFIED IRON DEFICIENCY ANEMIA TYPE: ICD-10-CM

## 2024-01-09 RX ORDER — FERROUS SULFATE 325(65) MG
325 TABLET ORAL
Qty: 90 TABLET | Refills: 2 | Status: SHIPPED | OUTPATIENT
Start: 2024-01-09 | End: 2024-05-14

## 2024-01-22 ENCOUNTER — TELEPHONE (OUTPATIENT)
Dept: IMMUNOLOGY | Facility: CLINIC | Age: 49
End: 2024-01-22
Payer: COMMERCIAL

## 2024-01-22 NOTE — TELEPHONE ENCOUNTER
Pt called sw to ask her to fax labs to [facility] - pt working with them for housing and other resources.  Pt is ok with sw sending labs.  Sw to fax labs.  Pt has no other questions for sw at this time.

## 2024-01-26 DIAGNOSIS — M17.0 OSTEOARTHRITIS OF BOTH KNEES, UNSPECIFIED OSTEOARTHRITIS TYPE: ICD-10-CM

## 2024-01-26 RX ORDER — DICLOFENAC SODIUM 10 MG/G
4 GEL TOPICAL 2 TIMES DAILY PRN
Qty: 240 G | Refills: 3 | Status: SHIPPED | OUTPATIENT
Start: 2024-01-26

## 2024-01-26 NOTE — PROGRESS NOTES
Pt called with knee pain not getting relief from tylenol -cannot take motrin.  Already trying heat and ice alternating.  Per Dr. Dony gay for voltaran gel.

## 2024-02-01 ENCOUNTER — OFFICE VISIT (OUTPATIENT)
Dept: GASTROENTEROLOGY | Facility: HOSPITAL | Age: 49
End: 2024-02-01
Payer: COMMERCIAL

## 2024-02-01 VITALS
DIASTOLIC BLOOD PRESSURE: 74 MMHG | OXYGEN SATURATION: 100 % | WEIGHT: 168.4 LBS | HEIGHT: 64 IN | BODY MASS INDEX: 28.75 KG/M2 | HEART RATE: 73 BPM | RESPIRATION RATE: 18 BRPM | TEMPERATURE: 97.2 F | SYSTOLIC BLOOD PRESSURE: 100 MMHG

## 2024-02-01 DIAGNOSIS — K63.2 ENTEROCUTANEOUS FISTULA: Primary | ICD-10-CM

## 2024-02-01 PROCEDURE — 1036F TOBACCO NON-USER: CPT | Performed by: STUDENT IN AN ORGANIZED HEALTH CARE EDUCATION/TRAINING PROGRAM

## 2024-02-01 PROCEDURE — 3008F BODY MASS INDEX DOCD: CPT | Performed by: STUDENT IN AN ORGANIZED HEALTH CARE EDUCATION/TRAINING PROGRAM

## 2024-02-01 PROCEDURE — 99215 OFFICE O/P EST HI 40 MIN: CPT | Performed by: STUDENT IN AN ORGANIZED HEALTH CARE EDUCATION/TRAINING PROGRAM

## 2024-02-01 PROCEDURE — 99205 OFFICE O/P NEW HI 60 MIN: CPT | Performed by: STUDENT IN AN ORGANIZED HEALTH CARE EDUCATION/TRAINING PROGRAM

## 2024-02-01 PROCEDURE — 3074F SYST BP LT 130 MM HG: CPT | Performed by: STUDENT IN AN ORGANIZED HEALTH CARE EDUCATION/TRAINING PROGRAM

## 2024-02-01 PROCEDURE — 3078F DIAST BP <80 MM HG: CPT | Performed by: STUDENT IN AN ORGANIZED HEALTH CARE EDUCATION/TRAINING PROGRAM

## 2024-02-01 RX ORDER — HYDROGEN PEROXIDE 3 %
40 SOLUTION, NON-ORAL MISCELLANEOUS
Qty: 60 CAPSULE | Refills: 4 | Status: SHIPPED | OUTPATIENT
Start: 2024-02-01 | End: 2024-02-06 | Stop reason: DRUGHIGH

## 2024-02-01 SDOH — ECONOMIC STABILITY: FOOD INSECURITY: WITHIN THE PAST 12 MONTHS, YOU WORRIED THAT YOUR FOOD WOULD RUN OUT BEFORE YOU GOT MONEY TO BUY MORE.: SOMETIMES TRUE

## 2024-02-01 SDOH — ECONOMIC STABILITY: FOOD INSECURITY: WITHIN THE PAST 12 MONTHS, THE FOOD YOU BOUGHT JUST DIDN'T LAST AND YOU DIDN'T HAVE MONEY TO GET MORE.: SOMETIMES TRUE

## 2024-02-01 ASSESSMENT — ENCOUNTER SYMPTOMS
OCCASIONAL FEELINGS OF UNSTEADINESS: 0
LOSS OF SENSATION IN FEET: 0
DEPRESSION: 0

## 2024-02-01 ASSESSMENT — PATIENT HEALTH QUESTIONNAIRE - PHQ9
SUM OF ALL RESPONSES TO PHQ9 QUESTIONS 1 AND 2: 0
1. LITTLE INTEREST OR PLEASURE IN DOING THINGS: NOT AT ALL
2. FEELING DOWN, DEPRESSED OR HOPELESS: NOT AT ALL

## 2024-02-01 ASSESSMENT — PAIN SCALES - GENERAL: PAINLEVEL: 0-NO PAIN

## 2024-02-01 ASSESSMENT — COLUMBIA-SUICIDE SEVERITY RATING SCALE - C-SSRS
6. HAVE YOU EVER DONE ANYTHING, STARTED TO DO ANYTHING, OR PREPARED TO DO ANYTHING TO END YOUR LIFE?: NO
1. IN THE PAST MONTH, HAVE YOU WISHED YOU WERE DEAD OR WISHED YOU COULD GO TO SLEEP AND NOT WAKE UP?: NO
2. HAVE YOU ACTUALLY HAD ANY THOUGHTS OF KILLING YOURSELF?: NO

## 2024-02-01 NOTE — PROGRESS NOTES
"Outpatient GI Progress Note     Name: Veena Garza  : 1975  MRN: [id number]  Date: 2024    History of Present Illness:  Patient is a 48 y.o. F with a PMH significant for HIV on (ART), HBV, CKD, asthma, unprovoked DVT, NSTEMI, HTN, MDD with psychosis, PTSD, social anxiety disorder, GERD, angeline en Y gastric bypass () complicated by NSTI abdominal wall from known enterocutaneous fistula s/p abdominal wall debridement complicated by septic  shock and multiple inpatient hospital/ ICU admissions for debridement, necrotizing soft tissue infection, subcutaneous abscess and septic shock throughout  (23 - 3/08/23) who presents to the  GI clinic for assessment of her enterocutaneous fistula.     At present, she continues to have leakage of consumed foods and fluids from the fistula. She states that when consuming large amounts of fluids, she will experience a significant leakage, however with low to moderate fluid consumption, leakage is low to moderate.  She also has leakage for food particulates as well.  At present, has food pouring out the ECF site.  She states that in the past, she was obtaining wound care and wound care supplies that was arranged by a surgery service, however has not been seen or obtained supplies in several months as the supply company did no receive responses to sent faxes.  She has not had pain or infection at the ECF site and has otherwise felt well. Most recent imaging performed 2023 showed:    \"Overall unchanged size of chronic complex thick walled midline abdominal fluid collection (7.7 x 4.6 x 1.3 cm) at site of known enterocutaneous fistula, again associated with closely apposed small-bowel loops to the deep aspect of the fluid collection due to adhesions/tethering, and protrusion of a small bowel loop directly into the collection similar to prior study. Contents of the collection contained liquified on liquified material that appears identical to that expected " "for feculent material; note, absence of oral contrast severely limits assessment for persistent/active enterocutaneous fistula. No sizable direct fluid-filled defect seen between the collection and small bowel loops. No new or enlarging fluid collection or new or worsening inflammatory changes in the abdominal wall.\"    Imaging also revealed an incidental finding of a 2.3 cm mass in the left kidney suspicious for renal cell carcinoma however found on previous imaging and stable in size and unchanged since 2021.  Per chart review, patient was seen at the surgery clinic on 11/22/2023 and referred to the ED as it was observed that food was pouring out of the fistula as well as a cut G tube. At the ED, she was found to be clinically stable without signs of infection at the fistula.  She was also evaluated by general surgery whom found no indication for surgical admission with recommendation for wound care consultation to assist with obtaining wound healing and supplies with referral to the wound care clinic for further management with follow up in the surgery clinic in 2-3 months.  However patient states that she was referred to GI.  Patient was discharged from home care, pharmacy, and home care supply company for noncompliance as patient could not be reached and was not present for home care visit.  Patient states that her phone had bad service and because of this, it was difficult for her to be reached.  Today, patient states that she lost her phone and is currently using her daughter's phone and can be reached at: Daughter Bree Garza's phone  [phone]    Review of Systems:  As per HPI.     Past Medical History:  Past Medical History:   Diagnosis Date    Age-related nuclear cataract, bilateral 08/02/2017    Cataract, nuclear sclerotic, both eyes    Age-related nuclear cataract, left eye 08/02/2017    Age-related nuclear cataract of left eye    Age-related nuclear cataract, right eye 08/02/2017    Age-related " nuclear cataract of right eye    Allergy status to unspecified drugs, medicaments and biological substances 03/02/2017    History of seasonal allergies    Anemia complicating pregnancy, unspecified trimester 08/25/2016    Iron deficiency anemia of pregnancy    Anemia, unspecified 07/21/2013    Anemia    Asymptomatic human immunodeficiency virus (hiv) infection status (CMS/[facility]) 05/12/2016    HIV positive    Bariatric surgery status complicating pregnancy, unspecified trimester 08/25/2016    Pregnancy complicated by previous gastric bypass, antepartum    Cortical age-related cataract, left eye 08/02/2017    Cortical age-related cataract of left eye    Cortical age-related cataract, right eye 08/02/2017    Cortical age-related cataract of right eye    Diaphragmatic hernia without obstruction or gangrene 04/09/2014    Hiatal hernia    Displacement of intrauterine contraceptive device, initial encounter 05/26/2016    Malpositioned IUD    Encounter for gynecological examination (general) (routine) without abnormal findings 08/22/2019    Well woman exam    Essential (primary) hypertension 11/02/2021    Hypertension    Human immunodeficiency virus (HIV) disease complicating pregnancy, second trimester 07/09/2016    HIV disease affecting pregnancy in second trimester    Human immunodeficiency virus (HIV) disease complicating pregnancy, third trimester 08/25/2016    HIV disease affecting pregnancy in third trimester    Irritant contact dermatitis due friction or contact with other specified body fluids 05/26/2016    Drainage from wound    Maternal care for other (suspected) fetal abnormality and damage, fetal cardiac anomalies, not applicable or unspecified 06/24/2016    Abnormal fetal echocardiogram affecting antepartum care of mother    Morbid (severe) obesity due to excess calories (CMS/[facility]) 05/03/2018    Obesity, Class III, BMI 40-49.9 (morbid obesity)    Obesity complicating pregnancy, unspecified trimester 08/25/2016     Obesity affecting pregnancy    Obesity, unspecified 2013    Obesity    Other conditions influencing health status 2016    History of pregnancy    Other injury of unspecified body region, initial encounter 2016    Wound infection    Pain in unspecified knee 2016    Joint pain, knee    Personal history of other mental and behavioral disorders 2016    History of depression    Personal history of other specified conditions 2016    History of urinary frequency    Personal history of urinary (tract) infections 2016    History of urinary tract infection    Seborrhea capitis 2014    Dandruff    Supervision of elderly multigravida, third trimester 2016    Supervision of elderly multigravida, third trimester    Supervision of high risk pregnancy, unspecified, unspecified trimester 2016    Pregnancy, high-risk    Supervision of other high risk pregnancies, third trimester 2016    History of  delivery, currently pregnant in third trimester    Threatened  2016    , threatened    Unilateral primary osteoarthritis, unspecified knee 2016    Arthritis of knee    Urinary tract infection, site not specified 2016    UTI (lower urinary tract infection)       Past Surgical History:  Past Surgical History:   Procedure Laterality Date    CT GUIDED PERCUTANEOUS PERITONEAL OR RETROPERITONEAL FLUID COLLECTION DRAINAGE  3/31/2023    CT GUIDED PERCUTANEOUS PERITONEAL OR RETROPERITONEAL FLUID COLLECTION DRAINAGE [facility] CT    DILATION AND CURETTAGE OF UTERUS  2014    Dilation And Curettage    EXPLORATORY LAPAROTOMY  2014    Exploratory Laparotomy    GASTRIC BYPASS  2016    Gastric Surgery For Morbid Obesity Bypass With Freddy-en-Y    OTHER SURGICAL HISTORY  2014    Ventral Hernia Repair With Implantation Of Mesh    OTHER SURGICAL HISTORY  2014    Gynec[facility] Services Intrauterine Device (IUD) Insertion       Family  "History:  Family History   Problem Relation Name Age of Onset    Hypertension Sister      Heart failure Brother      Diabetes Brother      Hypertension Brother      Asthma Son      Seizures Son          Medications:  Current Outpatient Medications   Medication Instructions    acetaminophen (TYLENOL) 500 mg, oral, 3 times daily PRN    albuterol 90 mcg/actuation inhaler 1-2 puffs, inhalation, Every 6 hours PRN    amLODIPine (NORVASC) 10 mg, oral, Daily RT    apixaban (Eliquis) 5 mg tablet TAKE 1 TABLET BY MOUTH EVERY 12 HOURS.    brexpiprazole 3 mg, oral    cholecalciferol (VITAMIN D-3) 50 mcg, oral, Daily    cyanocobalamin (Vitamin B-12) 500 mcg tablet oral    desvenlafaxine (Pristiq) 50 mg 24 hr tablet TAKE 1 TABLET BY MOUTH AT BEDTIME    diclofenac sodium (VOLTAREN) 4 g, Topical, 2 times daily PRN    dolutegravir (TIVICAY) 50 mg, oral, Daily with breakfast    doravirine (Pifeltro) 100 mg tablet tablet 1 tablet, oral, Daily RT    emtricitabine (EMTRIVA) 200 mg, oral, Daily    esomeprazole (NEXIUM) 40 mg, oral, Daily RT, Do not open capsule.    ferrous sulfate, 325 mg ferrous sulfate, tablet 1 tablet, oral, 3 times daily with meals    hydroCHLOROthiazide (MICROZIDE) 12.5 mg, oral, Every morning    mirtazapine (Remeron) 15 mg tablet     multivitamin tablet TAKE 1 TABLET BY MOUTH ONCE DAILY    polyethylene glycol (Glycolax, Miralax) 17 gram/dose powder MIX ONE CAPFUL (17 GRAMS) IN 8 OUNCES OF LIQUID AND DRINK BY MOUTH ONCE DAILY    sennosides (Senokot) 8.6 mg tablet TAKE 1 TABLET BY MOUTH TWO TIMES A DAY    Vemlidy 25 mg, oral, Daily       Allergies:  Allergies   Allergen Reactions    Latex Hives and Rash    Morphine Anaphylaxis, Hives, Itching, Other, Shortness of breath and Swelling     Patient reports \"stopped breathing\" and \"throat swelling\" to morphine. She states she has tolerated Percocet in the past.    Shellfish Derived Rash    Vancomycin Hives         Physical Exam:  Physical Exam  Vitals reviewed. "   Constitutional:       General: She is not in acute distress.     Appearance: She is not ill-appearing, toxic-appearing or diaphoretic.   HENT:      Head: Normocephalic and atraumatic.      Right Ear: External ear normal.      Left Ear: External ear normal.      Mouth/Throat:      Mouth: Mucous membranes are moist.      Pharynx: Oropharynx is clear.   Eyes:      General: No scleral icterus.        Right eye: No discharge.         Left eye: No discharge.      Extraocular Movements: Extraocular movements intact.      Pupils: Pupils are equal, round, and reactive to light.   Cardiovascular:      Rate and Rhythm: Normal rate and regular rhythm.   Pulmonary:      Effort: Pulmonary effort is normal. No respiratory distress.      Breath sounds: Normal breath sounds. No stridor. No wheezing, rhonchi or rales.   Chest:      Chest wall: No tenderness.   Abdominal:      Comments: Midabdominal ECF visually obscured by contents pouring from site.  Nontender, no erythema, no purulence noted.     Musculoskeletal:         General: Normal range of motion.      Cervical back: Normal range of motion.   Skin:     General: Skin is warm and dry.      Capillary Refill: Capillary refill takes less than 2 seconds.   Neurological:      General: No focal deficit present.      Mental Status: She is alert and oriented to person, place, and time. Mental status is at baseline.   Psychiatric:         Mood and Affect: Mood normal.         Thought Content: Thought content normal.       Vitals:  Vitals:    02/01/24 1513   BP: 100/74   Pulse: 73   Resp: 18   Temp: 36.2 °C (97.2 °F)   SpO2: 100%        Labs:  Lab Results   Component Value Date    WBC 5.9 11/22/2023    HGB 11.1 (L) 11/22/2023    HCT 35.6 (L) 11/22/2023    MCV 92 11/22/2023     11/22/2023     Lab Results   Component Value Date    GLUCOSE 76 11/22/2023    CALCIUM 8.1 (L) 11/22/2023     11/22/2023    K 4.6 11/22/2023    CO2 23 11/22/2023     (H) 11/22/2023    BUN 11  11/22/2023    CREATININE 1.19 (H) 11/22/2023     Lab Results   Component Value Date    ALT 22 11/22/2023    AST 31 11/22/2023    ALKPHOS 304 (H) 11/22/2023    BILITOT 0.2 11/22/2023       Imaging:  XR abdomen 1 view  Status: Final result     PACS Images     Show images for XR abdomen 1 view  Signed by    Signed Time Phone Pager   Leidy Hallman MD 8/14/2023 14:47 [phone]      Exam Information    Status Exam Begun Exam Ended   Final  8/14/2023 11:02     Study Result    Narrative & Impression   Interpreted By:  LEIDY HALLMAN MD  MRN: [id number]  Patient Name: GUERITA MA     STUDY:  ABDOMEN AP VIEW;  8/14/2023 11:02 am     INDICATION:  Abd pain, assess stool burden .     COMPARISON:  Radiograph 02/25/2023. CT 08/06/2023.     ACCESSION NUMBER(S):  [id number]     ORDERING CLINICIAN:  AMBREEN PANTOJA     FINDINGS:  Stool is noted particularly in the rectum. Bowel-gas pattern is  unremarkable. There is no epi intestinal obstruction nor  extraluminal gas.     Surgical sutures are noted within the left upper quadrant.  Calcification projected over the lower pole of the right kidney is  compatible with a stone, and probably corresponds to that  demonstrated on the CT of 08/06/2023 pelvic calcifications may  represent coincidental phleboliths.     Soft tissue contours and osseous structures are grossly intact     IMPRESSION:  1.  Unremarkable bowel gas pattern. No epi intestinal obstruction.  2. Right renal stone.     MACRO:  None       CT abdomen pelvis w IV contrast  Status: Final result     PACS Images     Show images for CT abdomen pelvis w IV contrast  Signed by    Signed Time Phone Pager   Rosalina Farrar MD 8/06/2023 08:33 [phone]     Exam Information    Status Exam Begun Exam Ended   Final  8/06/2023 07:51     Study Result    Narrative & Impression   Interpreted By:  ROSALINA FARRAR MD  MRN: [id number]  Patient Name: TRUNG MAIE     STUDY:  CT ABDOMEN AND PELVIS W IV CONTRAST;  8/6/2023 7:51  am     INDICATION:  sepsis with prior necrotizing soft tissue infection, Lie Flat: Yes .     COMPARISON:  05/04/2023 03/29/2023     ACCESSION NUMBER(S):  [id number]     ORDERING CLINICIAN:  KAILYN CULVER     TECHNIQUE:  Contiguous axial images of the abdomen and pelvis were obtained after  the intravenous administration of 80 mL Omnipaque 350 contrast.  Coronal and sagittal reformatted images were reconstructed from the  axial data.     FINDINGS:  LOWER CHEST: Mild coronary artery calcifications. No pericardial  effusion. Tiny sliding hiatal hernia involving the gastric pouch of  Freddy-en-Y gastric bypass. There has been resolution of previous  bilateral pleural effusions and improvement and bibasilar  interstitial and airspace opacities. There are now mild reticular and  subtle ill-defined ground-glass opacities in the lower lobes. There  is mild lingular atelectasis, unchanged.        ABDOMEN/PELVIS:     ABDOMINAL WALL: There is redemonstration of a thick-walled complex  midline abdominal fluid collection with an overlying wound VAC.  Similar prior, this collection is surrounded by thick rind of  enhancing soft tissue representing scarring and granulation tissue.  This collection contains heterogeneous liquified and non-liquified  material with interspersed locules of gas. The size of the fluid  collection measures 7.7 cm x 4.6 cm x 1.3 cm (craniocaudal X  transverse X AP), overall similar in size from prior study (5.8 cm x  6.3 cm x 1.6 cm). There is no new or increased surrounding  inflammatory fat stranding. There is redemonstration of a metallic  foreign body imbedded in the subcutaneous fat just inferior to this  wound and just deep to the skin surface, measuring 0.7 cm.     LIVER: No significant parenchymal abnormality.     BILE DUCTS: No significant intrahepatic or extrahepatic dilatation.     GALLBLADDER: Cholelithiasis. There is unchanged minimal wall  thickening of the gallbladder between the lateral wall  and the  hepatic parenchyma, likely chronically reactive in nature. There is  no significant gallbladder dilatation or surrounding inflammation.     PANCREAS: No significant abnormality.     SPLEEN: There are hypoenhancing wedge-shaped areas in the peripheral  aspect of the posterior spleen (2.2 cm x 1.4 cm) and lateral spleen  (0.8 cm x 0.5) representing infarcts. The spleen is normal in size.     ADRENALS: No significant abnormality.     KIDNEYS, URETERS, BLADDER: There is redemonstration of a 2.3 cm x 1.7  cm circumscribed mass in the posterior interpolar region of the left  kidney measuring 53 HU on current exam and 37 HU on noncontrast CT  05/24/2023 consistent with enhancing neoplasm, specifically renal  cell carcinoma (unchanged size vs 05/24/2023). There is a small left  extrarenal pelvis without left hydroureteronephrosis or  nephrolithiasis. There is a nonobstructing 0.9 cm calculus in the  lower pole of the right kidney. There is mild atrophy in the lower  pole of the right kidney, unchanged. There is diffuse thickening of  the urothelium in the right renal pelvic caliceal system and right  ureter similar to the thickening noted on 05/24/2023, 03/29/2023.  There are multiple too-small-to-characterize hypodensities in the  kidneys statistically representing benign cysts. The urinary bladder  wall appears within normal limits for degree of distention. There is  diffuse enhancement of the urethral urothelium.     REPRODUCTIVE ORGANS: The uterus appears within normal limits. There  is subcentimeter simple appearing right ovarian cyst. No left adnexal  mass.     VESSELS: The portal vein, splenic vein, and hepatic veins are patent.  Normal caliber abdominal aorta without significant atherosclerosis.     RETROPERITONEUM/LYMPH NODES: No enlarged lymph nodes. No acute  retroperitoneal abnormality.     BOWEL/MESENTERY/PERITONEUM: There are uncomplicated postsurgical  changes of gastric bypass. There is mild colonic  diverticulosis.  Multiple small bowel loops are again tethered/adhesed to the inside  of the abdominal wall at the level of the known enterocutaneous  fistula where there is soft tissue thickening throughout the anterior  preperitoneal space and surrounding the above-ascribed abdominal wall  fluid collection that likely represents the mix granulation tissue  and scarring. The absence of oral contrast is severely limits the  assessment for persistent active enterocutaneous fistula; that said,  no sizable fluid-filled communication is seen between the opposed  small bowel loops and the fluid collection. There is redemonstration  of a small bowel loop protruding slightly into the fluid collection  cavity best seen on sagittal image 59/135. No acute inflammatory  bowel wall thickening or dilatation. Normal appendix.     No ascites, free air, or fluid collection.        MUSCULOSKELETAL: No acute osseous abnormality.     IMPRESSION:  1. New infarctions the spleen (x2) with patent splenic artery and  vein.     2. Overall unchanged size of chronic complex thick walled midline  abdominal fluid collection (7.7 x 4.6 x 1.3 cm) at site of known  enterocutaneous fistula, again associated with closely apposed  small-bowel loops to the deep aspect of the fluid collection due to  adhesions/tethering, and protrusion of a small bowel loop directly  into the collection similar to prior study. Contents of the  collection contained liquified on liquified material that appears  identical to that expected for feculent material; note, absence of  oral contrast severely limits assessment for persistent/active  enterocutaneous fistula. No sizable direct fluid-filled defect seen  between the collection and small bowel loops. No new or enlarging  fluid collection or new or worsening inflammatory changes in the  abdominal wall.     3. Diffuse enhancement of the urethral urothelium that could relate  to urethritis. Unchanged diffuse thickening  enhancement of the right  renal collecting system and ureteral urothelium compared to the  recent previous studies as well as nonobstructing 0.9 cm right renal  calculus. This thickening could be caused by chronic irritation the  no correlation with urinalysis is recommended.     4. Unchanged enhancing 2.3 cm mass in the left kidney suspicious for  renal cell carcinoma. Recommend nonemergent multiphasic contrast  enhanced MRI for definitive characterization and urological follow-up  for appropriate management. PURPLE ALERT: An incidental finding alert  was sent per protocol.     CT abdomen and pelvis w oral contrast only  Status: Final result     PACS Images     Show images for CT abdomen and pelvis w oral contrast only  Signed by    Signed Time Phone Pager   Audrey Stearns MD 5/26/2023 08:40 [phone]     Exam Information    Status Exam Begun Exam Ended   Final  5/24/2023 14:01     Study Result    Narrative & Impression   Interpreted By:  AUDREY STEARNS MD and NADYA CADENA MD  MRN: [id number]  Patient Name: GUERITA MA     STUDY:  CT ABDOMEN AND PELVIS W ORAL CONTRAST ONLY;  5/24/2023 2:01 pm     INDICATION:  evaluating proximal ECF and anatomy, Lie Flat: Yes .     COMPARISON:  CT abdomen pelvis 05/15/2023, CT chest 05/16/2023 and CT abdomen pelvis 08/22/2005     ACCESSION NUMBER(S):  [id number]     ORDERING CLINICIAN:  ENMA PEREZ     TECHNIQUE:  CT of the abdomen and pelvis was performed. Contiguous axial images were obtained at 3 mm slice thickness through the abdomen and pelvis.   Coronal and sagittal reconstructions at 3 mm slice thickness were performed.  No intravenous contrast was administered; positive oral contrast was given.     FINDINGS:  Please note that the evaluation of vessels, lymph nodes and organs is limited without intravenous contrast.     LOWER CHEST:  Compared to CT chest dated 05/16/2023, there is improving  consolidative opacity in the right lower lobe which may relate to  resolving pneumonia. Superimposed platelike opacities within right lung base as well as left lung base platelike opacities which likely represent atelectasis. Decreasing small (Right  > Left) pleural effusions. There is a background of smooth interlobular septal thickening which likely represents pulmonary edema. The heart is normal in size with small pericardial effusion slightly increased from prior. Partially visualized moderate coronary artery calcifications. Tip of the central venous catheter terminates within the right atrium. Visualized distal esophagus appears normal     ABDOMEN:     LIVER:  The liver is normal in size and attenuation.     BILE DUCTS:  The intrahepatic and extrahepatic ducts are not dilated.     GALLBLADDER:  Cholelithiasis without evidence of cholecystitis.     PANCREAS:  The pancreas appears unremarkable without evidence of ductal dilatation or masses.     SPLEEN:  The spleen is normal in size.     ADRENAL GLANDS:  Bilateral adrenal glands appear normal.     KIDNEYS AND URETERS:  Kidneys are normal in size. A stable 2.2 cm homogeneous intermediate density (Hounsfield units 37.9) mass in the middle pole of the left kidney, unchanged from 2021. A stable 8 mm nonobstructive calculi in the inferior pole the right kidney. Prominent left extrarenal pelvis.  No evidence of hydroureteronephrosis.     PELVIS:     BLADDER:  The urinary bladder appears normal without abnormal wall thickening.     REPRODUCTIVE ORGANS:  The uterus is present. No pelvic masses.     BOWEL:  Postsurgical changes of Freddy-en-Y gastric bypass. Positive oral contrast is seen within the stomach to the level of the mid/distal small bowel. A small bowel loop in the mid abdomen which appears tethered to the ventral abdominal wall with contrast extravasating through a defect into the ventral abdominal wall defect (series 201, image 75) described below. There is mild small bowel wall thickening at the level of the wall defect,  likely reactive. Otherwise the small bowel is normal in caliber and demonstrates no wall thickening. A small bowel to small bowel anastomosis in the left lower quadrant of the pelvis without evidence of surrounding fluid or gas to suggest anastomotic dehiscence. Colonic diverticulosis without evidence of diverticulitis. The appendix appears normal.     VESSELS:  The aorta and IVC are normal in caliber.     PERITONEUM/RETROPERITONEUM/LYMPH NODES:  Trace pelvic free fluid, mildly increased from prior and likely  physiologic in nature. No loculated fluid collections. No free air.  No retroperitoneal lymphadenopathy. Unchanged prominent 1.1 cm right external iliac chain lymph node.     BONES AND ABDOMINAL WALL:  No suspicious osseous lesions are identified. Stable small sclerotic focus in the right femoral head, likely benign. Degenerative discogenic disease is noted in the lower thoracic and lumbar spine.     There is a thick walled ventral abdominal wall defect at midline measuring approximately 6.4 x 5.8 cm with mild surrounding stranding. It contains gas and hyperdense contrast material excreted from the adjacent bowel loop as seen on series 201, image 75). There is a metallic linear body which extends into the anterior portion of this defect.     New soft tissue nodules in the right mid and lower abdominal wall, some of which contain adjacent small foci of gas, likely corresponds to injection sites. A metallic foreign bodies imbedded within the superficial subcutaneous tissues in the right lower anterior abdominal wall (series 201, image 88). Mild abdominal wall edema.     IMPRESSION:  1. A thick wall ventral abdominal wall defect at midline measuring up to 6.4 cm, communicating with the adjacent small bowel loop through a small fistulous connection and extravasation of oral contrast onto the skin, compatible with an enterocutaneous fistula. Given the thick wall appearance and surrounding inflammatory superimposed  "infection is not excluded.  2. Postsurgical changes of Freddy-en-Y gastric bypass without evidence of anastomotic complication.  3. Improving right lower lobe consolidative opacity suggestive of improving infiltrate. Improving bilateral small left greater than right pleural effusions.  4. Mild increase in small pericardial effusion.  5. Unchanged nonobstructive 8 mm right renal calculus and a hyperattenuating lesion in the left kidney interpolar region compared to the CT of 2021.     I personally reviewed the images/study and I agree with the findings  as stated. This study was interpreted at [facility], [place], [place].       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  >> Patient confused regarding her treatment plans, possibly poor medical literacy    >> Enterography performed 05/24/2023 showed \"a thick wall ventral abdominal wall defect at midline measuring up to 6.4 cm, communicating with the adjacent small bowel loop through a small fistulous connection and extravasation of oral contrast onto the skin, compatible with an enterocutaneous fistula.\"   >>ECF located in mid abdominal wall.  Exact location is unknown but likely located in the jejunum based on imaging, however there is increased complexity due to altered anatomy from freddy en y bypass,   - will assist patient to " reestablish care with wound care clinic and surgery to assist with wound care supplies and treatment   - will start patient on esomeprazole, 40mg BID   - spoke with trauma surgery, will coordinate appointments for wound care and outpatient follow up with previous provide and Ms. Garza.    - Patient to be reached at daughter Bree Garza's phone  [phone]      Case reviewed and discussed with attending gastroenterologist, Bhavna Austin MD.        Gurmeet Jones MD, PhD  Gastroenterology Fellow, PGY-5  [facility]   Division of Gastroenterology and Liver Disease

## 2024-02-01 NOTE — PATIENT INSTRUCTIONS
#Enterocutaneous Fistula   1) Please schedule wound care appointment   2) Please schedule general surgery appointment   3) Please Nexium (Esomeprazole) 20mg twice a day - once in the morning and once at bedtime

## 2024-02-06 RX ORDER — ESOMEPRAZOLE MAGNESIUM 40 MG/1
CAPSULE, DELAYED RELEASE ORAL
Qty: 60 CAPSULE | Refills: 3 | Status: SHIPPED | OUTPATIENT
Start: 2024-02-06

## 2024-02-08 ENCOUNTER — OFFICE VISIT (OUTPATIENT)
Dept: IMMUNOLOGY | Facility: CLINIC | Age: 49
End: 2024-02-08
Payer: COMMERCIAL

## 2024-02-08 ENCOUNTER — NUTRITION (OUTPATIENT)
Dept: IMMUNOLOGY | Facility: CLINIC | Age: 49
End: 2024-02-08

## 2024-02-08 VITALS
SYSTOLIC BLOOD PRESSURE: 103 MMHG | DIASTOLIC BLOOD PRESSURE: 69 MMHG | BODY MASS INDEX: 29.12 KG/M2 | HEART RATE: 90 BPM | OXYGEN SATURATION: 100 % | RESPIRATION RATE: 16 BRPM | TEMPERATURE: 97.5 F | WEIGHT: 170.6 LBS | HEIGHT: 64 IN

## 2024-02-08 DIAGNOSIS — K91.2 MALNUTRITION FOLLOWING GASTROINTESTINAL SURGERY (HHS-HCC): ICD-10-CM

## 2024-02-08 DIAGNOSIS — B20 SYMPTOMATIC HIV INFECTION (MULTI): Primary | ICD-10-CM

## 2024-02-08 DIAGNOSIS — K63.2 ENTEROCUTANEOUS FISTULA: ICD-10-CM

## 2024-02-08 LAB
25(OH)D3 SERPL-MCNC: 11 NG/ML (ref 30–100)
ALBUMIN SERPL BCP-MCNC: 3 G/DL (ref 3.4–5)
ALP SERPL-CCNC: 311 U/L (ref 33–110)
ALT SERPL W P-5'-P-CCNC: 11 U/L (ref 7–45)
ANION GAP SERPL CALC-SCNC: 10 MMOL/L (ref 10–20)
AST SERPL W P-5'-P-CCNC: 19 U/L (ref 9–39)
BASOPHILS # BLD AUTO: 0.03 X10*3/UL (ref 0–0.1)
BASOPHILS NFR BLD AUTO: 0.5 %
BILIRUB SERPL-MCNC: 0.2 MG/DL (ref 0–1.2)
BUN SERPL-MCNC: 13 MG/DL (ref 6–23)
CALCIUM SERPL-MCNC: 8.5 MG/DL (ref 8.6–10.6)
CHLORIDE SERPL-SCNC: 111 MMOL/L (ref 98–107)
CO2 SERPL-SCNC: 23 MMOL/L (ref 21–32)
CREAT SERPL-MCNC: 1.41 MG/DL (ref 0.5–1.05)
EGFRCR SERPLBLD CKD-EPI 2021: 46 ML/MIN/1.73M*2
EOSINOPHIL # BLD AUTO: 0.19 X10*3/UL (ref 0–0.7)
EOSINOPHIL NFR BLD AUTO: 3.3 %
ERYTHROCYTE [DISTWIDTH] IN BLOOD BY AUTOMATED COUNT: 14.5 % (ref 11.5–14.5)
FERRITIN SERPL-MCNC: 21 NG/ML (ref 8–150)
FOLATE SERPL-MCNC: 6 NG/ML
GLUCOSE SERPL-MCNC: 101 MG/DL (ref 74–99)
HCT VFR BLD AUTO: 34.2 % (ref 36–46)
HGB BLD-MCNC: 10.2 G/DL (ref 12–16)
IMM GRANULOCYTES # BLD AUTO: 0.01 X10*3/UL (ref 0–0.7)
IMM GRANULOCYTES NFR BLD AUTO: 0.2 % (ref 0–0.9)
LYMPHOCYTES # BLD AUTO: 2.1 X10*3/UL (ref 1.2–4.8)
LYMPHOCYTES NFR BLD AUTO: 37 %
MCH RBC QN AUTO: 27.9 PG (ref 26–34)
MCHC RBC AUTO-ENTMCNC: 29.8 G/DL (ref 32–36)
MCV RBC AUTO: 94 FL (ref 80–100)
MONOCYTES # BLD AUTO: 0.37 X10*3/UL (ref 0.1–1)
MONOCYTES NFR BLD AUTO: 6.5 %
NEUTROPHILS # BLD AUTO: 2.98 X10*3/UL (ref 1.2–7.7)
NEUTROPHILS NFR BLD AUTO: 52.5 %
NRBC BLD-RTO: 0 /100 WBCS (ref 0–0)
PLATELET # BLD AUTO: 280 X10*3/UL (ref 150–450)
POTASSIUM SERPL-SCNC: 4.9 MMOL/L (ref 3.5–5.3)
PROT SERPL-MCNC: 7.4 G/DL (ref 6.4–8.2)
RBC # BLD AUTO: 3.65 X10*6/UL (ref 4–5.2)
SODIUM SERPL-SCNC: 139 MMOL/L (ref 136–145)
VIT B12 SERPL-MCNC: 534 PG/ML (ref 211–911)
WBC # BLD AUTO: 5.7 X10*3/UL (ref 4.4–11.3)

## 2024-02-08 PROCEDURE — 3008F BODY MASS INDEX DOCD: CPT | Performed by: INTERNAL MEDICINE

## 2024-02-08 PROCEDURE — 82746 ASSAY OF FOLIC ACID SERUM: CPT | Performed by: INTERNAL MEDICINE

## 2024-02-08 PROCEDURE — 88185 FLOWCYTOMETRY/TC ADD-ON: CPT | Mod: TC | Performed by: INTERNAL MEDICINE

## 2024-02-08 PROCEDURE — 3078F DIAST BP <80 MM HG: CPT | Performed by: INTERNAL MEDICINE

## 2024-02-08 PROCEDURE — 82607 VITAMIN B-12: CPT | Performed by: INTERNAL MEDICINE

## 2024-02-08 PROCEDURE — 99214 OFFICE O/P EST MOD 30 MIN: CPT | Performed by: INTERNAL MEDICINE

## 2024-02-08 PROCEDURE — 1036F TOBACCO NON-USER: CPT | Performed by: INTERNAL MEDICINE

## 2024-02-08 PROCEDURE — 3074F SYST BP LT 130 MM HG: CPT | Performed by: INTERNAL MEDICINE

## 2024-02-08 PROCEDURE — 82728 ASSAY OF FERRITIN: CPT | Performed by: INTERNAL MEDICINE

## 2024-02-08 PROCEDURE — 80053 COMPREHEN METABOLIC PANEL: CPT | Performed by: INTERNAL MEDICINE

## 2024-02-08 PROCEDURE — 99214 OFFICE O/P EST MOD 30 MIN: CPT | Mod: 25 | Performed by: INTERNAL MEDICINE

## 2024-02-08 PROCEDURE — 87536 HIV-1 QUANT&REVRSE TRNSCRPJ: CPT | Performed by: INTERNAL MEDICINE

## 2024-02-08 PROCEDURE — 85025 COMPLETE CBC W/AUTO DIFF WBC: CPT | Performed by: INTERNAL MEDICINE

## 2024-02-08 PROCEDURE — 82306 VITAMIN D 25 HYDROXY: CPT | Performed by: INTERNAL MEDICINE

## 2024-02-08 ASSESSMENT — PAIN SCALES - GENERAL: PAINLEVEL: 9

## 2024-02-08 NOTE — ASSESSMENT & PLAN NOTE
Still leak post Freddy en Y. Encouraged her to follow up with surgery and wound clinic-needs those ostomy supplies to protect her skin. Will also try semielemental supplements to help with nutrition-appreciate dietician input. Will also check B12, folate, vit D today

## 2024-02-08 NOTE — ASSESSMENT & PLAN NOTE
Says taking her meds to me but not everyone. Is getting them , verified with pharmacy (TAF, FTC and DTG all separate pills/capsule). Check labsl today.

## 2024-02-08 NOTE — PROGRESS NOTES
Subjective   Veena Garza is a 48 y.o. female who presents to the [facility] for follow-up of HIV infection.   She is still with major enteral leak from upper GI track-had half a sandwich and it is there as mush in the dressing covering the leak. Saw GI last week, nothing to offer but started PPI and referred her back to gen surgery and also wound clinic. I reinforced to her importance of wound clinic to get proper ostomy bags to at least better protect her skin.   She is having bowel movements, so seems not all food coming out her abd wall. With her poor nutrition, have asked dietician to see if semielemental supplements might improve her absorption for what doesn't leak out.  Told me she is taking her meds though told the nurse was not taking one of them.   Still has home health  Objective   Vitals:    02/08/24 1330   BP: 103/69   Pulse: 90   Resp: 16   Temp: 36.4 °C (97.5 °F)   SpO2: 100%        Current Outpatient Medications:     acetaminophen (Tylenol) 500 mg tablet, Take 1 tablet (500 mg) by mouth 3 times a day as needed., Disp: , Rfl:     albuterol 90 mcg/actuation inhaler, Inhale 1-2 puffs every 6 hours if needed., Disp: , Rfl:     amLODIPine (Norvasc) 10 mg tablet, Take 1 tablet (10 mg) by mouth once daily., Disp: 30 tablet, Rfl: 5    apixaban (Eliquis) 5 mg tablet, TAKE 1 TABLET BY MOUTH EVERY 12 HOURS., Disp: 60 tablet, Rfl: 0    cholecalciferol (Vitamin D-3) 25 MCG (1000 UT) capsule, Take 2 capsules (50 mcg) by mouth once daily., Disp: 60 capsule, Rfl: 5    cyanocobalamin (Vitamin B-12) 500 mcg tablet, Take by mouth., Disp: , Rfl:     diclofenac sodium (Voltaren) 1 % gel, Apply 4.5 inches (4 g) topically 2 times a day as needed (for knee pain)., Disp: 240 g, Rfl: 3    dolutegravir (Tivicay) 50 mg tablet, Take 1 tablet (50 mg) by mouth once daily with a meal., Disp: 30 tablet, Rfl: 5    emtricitabine (Emtriva) 200 mg capsule, Take 1 capsule (200 mg) by mouth once daily., Disp: 30 capsule, Rfl: 5     esomeprazole (NexIUM) 40 mg DR capsule, Take one capsule in the morning and one capsule at bedtime every day.  Do not open capsule., Disp: 60 capsule, Rfl: 3    ferrous sulfate, 325 mg ferrous sulfate, tablet, Take 1 tablet by mouth 3 times a day with meals., Disp: 90 tablet, Rfl: 2    hydroCHLOROthiazide (Microzide) 12.5 mg capsule, Take 1 capsule (12.5 mg) by mouth once daily in the morning., Disp: 30 capsule, Rfl: 5    mirtazapine (Remeron) 15 mg tablet, , Disp: , Rfl:     multivitamin tablet, TAKE 1 TABLET BY MOUTH ONCE DAILY, Disp: 30 tablet, Rfl: 0    Vemlidy 25 mg tablet tablet, TAKE 1 TABLET BY MOUTH DAILY, Disp: 30 tablet, Rfl: 3    brexpiprazole 3 mg tablet, Take 3 mg by mouth., Disp: , Rfl:     desvenlafaxine (Pristiq) 50 mg 24 hr tablet, TAKE 1 TABLET BY MOUTH AT BEDTIME, Disp: 30 tablet, Rfl: 0    polyethylene glycol (Glycolax, Miralax) 17 gram/dose powder, MIX ONE CAPFUL (17 GRAMS) IN 8 OUNCES OF LIQUID AND DRINK BY MOUTH ONCE DAILY (Patient not taking: Reported on 2/8/2024), Disp: 510 g, Rfl: 0    sennosides (Senokot) 8.6 mg tablet, TAKE 1 TABLET BY MOUTH TWO TIMES A DAY (Patient not taking: Reported on 2/8/2024), Disp: 60 tablet, Rfl: 0   Physical Exam  Physical Exam  Constitutional:       General: not in acute distress.     Appearance: Normal appearance.   HENT:      Head: Normocephalic and atraumatic.      Pharynx: Oropharynx is clear. No oropharyngeal exudate.   Eyes:      General: No scleral icterus.  Cardiovascular:      Rate and Rhythm: Normal rate and regular rhythm.   Pulmonary:      Breath sounds: Normal breath sounds. No wheezing or rhonchi.   Abdominal:      Palpations: Abdomen is soft. Mid abdomen large entercutaneous leak with light brown mushy food (her sandwich remnants)     Tenderness: There is no abdominal tenderness.   Skin:     Findings: No rash.   Neurological:      Mental Status: alert.   Psychiatric:         Mood and Affect: Mood normal.     Laboratory  Lab Results   Component  "Value Date    DLL6TEEKJ 8,180 (A) 05/25/2023    [id number] 0.245 (L) 05/16/2023    VITD25 16 (A) 07/26/2023      Lab Results   Component Value Date    WBC 5.9 11/22/2023    HGB 11.1 (L) 11/22/2023    HCT 35.6 (L) 11/22/2023    MCV 92 11/22/2023     11/22/2023      Lab Results   Component Value Date    GLUCOSE 76 11/22/2023    CALCIUM 8.1 (L) 11/22/2023     11/22/2023    K 4.6 11/22/2023    CO2 23 11/22/2023     (H) 11/22/2023    BUN 11 11/22/2023    CREATININE 1.19 (H) 11/22/2023      Lab Results   Component Value Date    CHOL 63 09/14/2022    CHOL 115 09/18/2020    CHOL 141 01/29/2019     Lab Results   Component Value Date    HDL 22.3 (A) 09/14/2022    HDL 51.0 09/18/2020    HDL 47.3 01/29/2019     No results found for: \"LDLCALC\"  Lab Results   Component Value Date    TRIG 73 09/25/2023    TRIG 69 09/12/2023    TRIG 87 09/05/2023     No components found for: \"CHOLHDL\"      Assessment/Plan   Problem List Items Addressed This Visit       Enterocutaneous fistula    Current Assessment & Plan     Still leak post Freddy en Y. Encouraged her to follow up with surgery and wound clinic-needs those ostomy supplies to protect her skin. Will also try semielemental supplements to help with nutrition-appreciate dietician input. Will also check B12, folate, vit D today         HIV infection (CMS/[facility]) - Primary (Chronic)    Current Assessment & Plan     Says taking her meds to me but not everyone. Is getting them , verified with pharmacy (TAF, FTC and DTG all separate pills/capsule). Check labsl today.         Relevant Orders    Vitamin B12    CD4/8 Panel    CBC and Auto Differential    Comprehensive Metabolic Panel    HIV RNA, quantitative, PCR    Ferritin    Vitamin D 25-Hydroxy,Total (for eval of Vitamin D levels)    Folate     Other Visit Diagnoses       Malnutrition following gastrointestinal surgery        Relevant Orders    Vitamin D 25-Hydroxy,Total (for eval of Vitamin D levels)           Health " Maintenance  Refuses flu shot today  Marjorie Napoles MD

## 2024-02-09 ENCOUNTER — TELEPHONE (OUTPATIENT)
Dept: IMMUNOLOGY | Facility: CLINIC | Age: 49
End: 2024-02-09
Payer: COMMERCIAL

## 2024-02-09 LAB
CD3+CD4+ CELLS # BLD: 0.9 X10E9/L
CD3+CD4+ CELLS # BLD: 903 /MM3
CD3+CD4+ CELLS NFR BLD: 43 %
CD3+CD4+ CELLS/CD3+CD8+ CLL BLD: 1.43 %
CD3+CD8+ CELLS # BLD: 0.63 X10E9/L
CD3+CD8+ CELLS NFR BLD: 30 %
HIV1 RNA # PLAS NAA DL=20: NOT DETECTED {COPIES}/ML
HIV1 RNA SPEC NAA+PROBE-LOG#: NORMAL {LOG_COPIES}/ML
LYMPHOCYTES # SPEC AUTO: 2.1 X10*3/UL

## 2024-02-09 NOTE — TELEPHONE ENCOUNTER
"Sw called pt re: PHQ9 - responded 3 on thoughts she'd be better off dead.  Pt notes that she \"sometimes\" has these feelings, usually related to her recent health situations.  Pt denies any active suicidal thoughts - notes that she would \"never\" kill herself because her kids love her.    Pt is in care with psychiatrist and  through [facility].   Pt has shared feelings with psychiatrist, who notes they will continue to monitor.  Pt will call today to set up next appt with the.  Pt agreeable to sw calling pt next week to check in.    "

## 2024-02-13 VITALS — HEIGHT: 64 IN | BODY MASS INDEX: 29.28 KG/M2

## 2024-02-13 NOTE — PROGRESS NOTES
"Nutrition Assessment     Reason for Visit:  Veena Garza is a 48 y.o. female who was seen today 2/2 MD consult for suspected malnutrition and altered GI function.     Anthropometrics:  Anthropometrics  Height: 162.6 cm (5' 4\")  Weight: 170.6#   BMI: 29.3   Weight Hx:   02/08/23: 170#   02/01/23: 168#   11/22/23: 140#, 153# (unclear which measurement represented actual wt)   10/31/23: 146#   09/14/22: 160#   Weight change: 24# (16.4%) weight gain in the past 3 months (140# 11/22/23 -> 170# 2/8/24). Pt could not attribute significant weight gain to any cause. She denied increased intake.      Food And Nutrient Intake:  Food and Nutrient History: Pt was seen today 2/2 MD consult for suspected malnutrition. Pt has a PMHx significant for CKD, NSTEMI, HTN, MDD with psychosis, angeline en Y gastric bypass (2016) complicated by NSTI of abdominal wall from known enterocutaneous fistula s/p abdominal wall debridement c/b septic shock and multiple inpatient hospital/ ICU admissions for debridement, necrotizing soft tissue infection, subcutaneous abscess and septic shock throughout 2023. She continues to have leakage of consumed foods and fluids from the fistula. At this visit, contents of recent intake were observable at fistula site.     Pt was seen at the surgery clinic on 11/22/2023 and referred to the ED as it was observed that food was pouring out of the fistula. At the ED, she was found to be clinically stable without signs of infection at the fistula.  She was also evaluated by surgery whom found no indication for surgical admission. Wound care consultation was recommended to assist with obtaining wound healing and supplies with referral to the wound care clinic for further management. Pt was discharged from home care, pharmacy, and home care supply company for noncompliance as patient could not be reached and was not present for home care visit.     Pt stated that she may consume a single egg for breakfast with water. " "For lunch, she may have a half a turkey sandwich with water and later for dinner, the other half. Other foods consumed include onions and potatoes and baked chicken. She denied regular snacking. Reported intake is estimated to be meeting <50% of EER. Weight trend in EHR reflects a 24# weight gain in the past 3.5 months (146# 10/31/23 -> 170# 2/8/24) which cannot be explained by reported intake. Energy Intake: Poor < 50 %     Energy Needs  Calculated Energy Needs Using Equations  Height: 162.6 cm (5' 4\")  Weight Used for Equation Calculations: 77.4 kg (170 lb 9.6 oz)  Thooraor Equation (Overweight or Obese Patients): 1394  Equation Chosen to Use by RD: [employer]  Activity Factor: 1.1  Stress Factor: 1.2  Total Energy Needs: 1850    Nutrition Diagnosis      Nutrition Diagnosis  Patient has Nutrition Diagnosis: Yes  Diagnosis Status (1): New  Nutrition Diagnosis 1: Altered GI function  Related to (1): EC fistula  As Evidenced by (1): Food contents leaking from EC fistula; MD notes    Nutrition Interventions/Recommendations   Nutrition Prescription  Individualized Nutrition Prescription Provided for : After discussion with MD concerning suspected malnutrition and altered GI function 2/2 EC fistula and chronic leakage of GI contents, we will send CMN for [employer] Peptide 1.5 semi-elemental formula in attempt to reduce ostomy output and improve nutrition status of pt. Nutrition plan was shared with pt and she was ammenable to plan.  Food and Nutrition Delivery  Medical Food Supplement: Other, Specify:  Goals: Pending insurance approval, pt will consume [employer] Peptide 1.5 once daily (500 kcal, 24 g PRO).    Nutrition Monitoring and Evaluation   Food/Nutrient Related History Monitoring  Monitoring and Evaluation Plan: Enteral and parenteral nutrition intake  Enteral and Parenteral Nutrition Intake: Enteral nutrition formula/solution  Criteria: Pending insurance approval, pt will consume [employer] " Peptide 1.5 once daily.  Body Composition/Growth/Weight History  Monitoring and Evaluation Plan: Weight  Weight: Usual stated body weight (UBW)  Biochemical Data, Medical Tests and Procedures  Monitoring and Evaluation Plan: Nutritional anemia profile, Vitamin profile, Electrolyte/renal panel    Follow-up   Progress will be assessed in 1 month. The estimated number of remaining follow-ups at this time: 7+ at a frequency of ~Q1-3 months. The total number and frequency of remaining follow-ups may change depending on patient's progress.     Time spent with patient: 150 minutes of which 50 percent or greater was spent counseling and or coordinating care.

## 2024-02-15 DIAGNOSIS — E55.9 VITAMIN D DEFICIENCY: ICD-10-CM

## 2024-02-15 NOTE — PROGRESS NOTES
[facility]  TRAUMA CLINIC PROGRESS NOTE    Patient Name: Veena Garza  MRN: [id number]  Admit Date:   : 1975  AGE: 48 y.o.   GENDER: female  ==============================================================================  CHIEF COMPLAINT:   Follow up from  fistula management      OTHER MEDICAL PROBLEMS:  HIV   Bacteremia   CKD  Asthma   GERD  JASMIN  Hep B     INCIDENTAL FINDINGS:  NA     PROCEDURES:  23 I&D washout anterior abdominal wall   3/3/23 abdominal wall wound washout, partial closure, wound vac placement       PATHOLOGY:  NA  ==============================================================================  TODAY'S ASSESSMENT AND PLAN OF CARE:  Wound care    WOUND CARE  - Dry abd daily changes or PRN saturation  - Take daily showers  - Allow warm, soapy water to wash over wound  - Do not scrub at the wound  - When out of the shower, gently pat the wound dry.  - Do not apply lotions, ointments or creams  - Avoid soaking in bodies of water (bathtub, hot tubs, pools, lakes, etc) until wound is completely healed  - No heavy lifting > 15 lbs until 6 weeks after surgery      FOLLOW UP/CALL  - Follow up in 6 weeks for wound check at trauma/ACS clinic  - May return to work or school   - Return to clinic or ER sooner if pt. has any development of erythema, drainage, swelling, pain, fevers, or chills  - If you have questions or concerns that are not urgent, please feel free to call  [phone].  - Call [phone] to make additional appointment(s) as needed if unable to reschedule in office today    ==============================================================================  HISTORY OF PRESENT ILLNESS  47 yo female with history of HIV, CKD, Asthma, GERD, JASMIN, HBV, gastric bypass 2016 complicated by abdominal fistula and necrotizing soft tissue infection of the abdominal wall, DVT/PE    At clinic appointment on : patient to the clinic with reports of  not taking care of her fistula, concern for gastric contents pouring out of her fistula and a G tube that she cut the end off of.  She was advised to go to the ED for evaluation.    Today patient presents for wound check. She is eating, drinking, voiding and having flatus, bowel movements. She has been doing her own dressing changes at home.      MEDICAL HISTORY / ROS:  Admission history and ROS reviewed.   Patient denies:  fevers; chills; headache;  dizziness; chest pain; shortness of breath; nausea/vomiting/diarrhea/constipation; new/worsening abdominal pain or numbness/tingling/weakness of extremities.     Pertinent changes as follows:  N/a    PHYSICAL EXAM:  GCS 15, A+OX3, RRR, S1, S2, CTA=, no increased WOB.   Abd soft, nt, nd. Abdomen wound, see wound information  MAEx4, CARLOS 5/5 x4, no extremity edema noted. 2+pp.     Wound location: mid abdomen  Wound length: 2 cm  Wound width: 2 cm  Wound depth: 1.5 cm  Wound description: round healing abdominal wound    LABS:  No results found for this or any previous visit (from the past 24 hour(s)).  MEDICATIONS:  Current Outpatient Medications   Medication Sig Dispense Refill    acetaminophen (Tylenol) 500 mg tablet Take 1 tablet (500 mg) by mouth 3 times a day as needed.      albuterol 90 mcg/actuation inhaler Inhale 1-2 puffs every 6 hours if needed.      amLODIPine (Norvasc) 10 mg tablet Take 1 tablet (10 mg) by mouth once daily. 30 tablet 5    apixaban (Eliquis) 5 mg tablet TAKE 1 TABLET BY MOUTH EVERY 12 HOURS. 60 tablet 0    brexpiprazole 3 mg tablet Take 3 mg by mouth.      cholecalciferol (Vitamin D-3) 1,250 mcg (50,000 unit) wafer Take 1 Wafer (50,000 Units) by mouth every 7 days. 4 Wafer 5    cyanocobalamin (Vitamin B-12) 500 mcg tablet Take by mouth.      desvenlafaxine (Pristiq) 50 mg 24 hr tablet TAKE 1 TABLET BY MOUTH AT BEDTIME 30 tablet 0    diclofenac sodium (Voltaren) 1 % gel Apply 4.5 inches (4 g) topically 2 times a day as needed (for knee pain). 240  g 3    dolutegravir (Tivicay) 50 mg tablet Take 1 tablet (50 mg) by mouth once daily with a meal. 30 tablet 5    emtricitabine (Emtriva) 200 mg capsule Take 1 capsule (200 mg) by mouth once daily. 30 capsule 5    esomeprazole (NexIUM) 40 mg DR capsule Take one capsule in the morning and one capsule at bedtime every day.  Do not open capsule. 60 capsule 3    ferrous sulfate, 325 mg ferrous sulfate, tablet Take 1 tablet by mouth 3 times a day with meals. 90 tablet 2    hydroCHLOROthiazide (Microzide) 12.5 mg capsule Take 1 capsule (12.5 mg) by mouth once daily in the morning. 30 capsule 5    mirtazapine (Remeron) 15 mg tablet       multivitamin tablet TAKE 1 TABLET BY MOUTH ONCE DAILY 30 tablet 0    polyethylene glycol (Glycolax, Miralax) 17 gram/dose powder MIX ONE CAPFUL (17 GRAMS) IN 8 OUNCES OF LIQUID AND DRINK BY MOUTH ONCE DAILY (Patient not taking: Reported on 2/8/2024) 510 g 0    sennosides (Senokot) 8.6 mg tablet TAKE 1 TABLET BY MOUTH TWO TIMES A DAY (Patient not taking: Reported on 2/8/2024) 60 tablet 0    Vemlidy 25 mg tablet tablet TAKE 1 TABLET BY MOUTH DAILY 30 tablet 3     No current facility-administered medications for this visit.       IMAGING SUMMARY:  (summary of new imaging findings, not a copy of dictation)  N/a      I have reviewed all laboratory and imaging results ordered/pertinent for today's encounter.

## 2024-02-20 ENCOUNTER — TELEPHONE (OUTPATIENT)
Dept: IMMUNOLOGY | Facility: CLINIC | Age: 49
End: 2024-02-20
Payer: COMMERCIAL

## 2024-02-23 ENCOUNTER — APPOINTMENT (OUTPATIENT)
Dept: SURGERY | Facility: CLINIC | Age: 49
End: 2024-02-23
Payer: COMMERCIAL

## 2024-02-27 ENCOUNTER — CLINICAL SUPPORT (OUTPATIENT)
Dept: SURGERY | Facility: CLINIC | Age: 49
End: 2024-02-27
Payer: COMMERCIAL

## 2024-02-27 VITALS
HEIGHT: 64 IN | TEMPERATURE: 97.8 F | HEART RATE: 61 BPM | BODY MASS INDEX: 30.13 KG/M2 | RESPIRATION RATE: 16 BRPM | SYSTOLIC BLOOD PRESSURE: 131 MMHG | WEIGHT: 176.5 LBS | DIASTOLIC BLOOD PRESSURE: 85 MMHG | OXYGEN SATURATION: 100 %

## 2024-02-27 DIAGNOSIS — Z51.89 VISIT FOR WOUND CHECK: Primary | ICD-10-CM

## 2024-02-27 DIAGNOSIS — Z71.9 HEALTH EDUCATION: ICD-10-CM

## 2024-02-27 DIAGNOSIS — K63.2 ENTEROCUTANEOUS FISTULA: ICD-10-CM

## 2024-02-27 ASSESSMENT — PAIN SCALES - GENERAL: PAINLEVEL: 2

## 2024-03-01 ENCOUNTER — TELEPHONE (OUTPATIENT)
Dept: IMMUNOLOGY | Facility: CLINIC | Age: 49
End: 2024-03-01
Payer: COMMERCIAL

## 2024-03-05 ENCOUNTER — TELEPHONE (OUTPATIENT)
Dept: IMMUNOLOGY | Facility: CLINIC | Age: 49
End: 2024-03-05
Payer: COMMERCIAL

## 2024-03-11 ENCOUNTER — NUTRITION (OUTPATIENT)
Dept: IMMUNOLOGY | Facility: CLINIC | Age: 49
End: 2024-03-11
Payer: COMMERCIAL

## 2024-03-11 VITALS — BODY MASS INDEX: 30.3 KG/M2 | HEIGHT: 64 IN

## 2024-03-11 NOTE — PROGRESS NOTES
"Nutrition Assessment     Reason for Visit:  Veena Garza is a 48 y.o. female who was called today for follow-up.     Anthropometrics:  Anthropometrics  Height: 162.6 cm (5' 4\")  Weight: 80.1 kg (176.5#)   Weight change: 6# weight gain in ~3 weeks (170.6# 2/8/24 -> 176.5# 2/27/24).      Food And Nutrient Intake:  Food and Nutrient History  Food and Nutrient History: Pt was called today for follow-up. This RD originally faxed signed CMN for [employer] Peptide 1.5 on 2/14/2024 after reaching out to [facility] to learn the necessary HCPCS code for semielemental formula at which time this RD was told . This was indicated on the CMN faxed on 2/14. This RD received a new, unsigned CMN fax from [facility] on 3/1/24. This RD was out of office until 3/8/24. This RD contacted [facility] on 3/11 to determine reason for new CMN. Apparently, the HCPCS code provided was incorrect. The HCPCS code was updated by Basile (), faxed, and was subsequently signed by MD and faxed back to [facility] on 3/11. The pt was called and updated on the above. She reported an improving appetite and has been nibbling on various food items/day. Foods consumed regularly at this time include: sandwiches, potato chips, chicken, eggs with cheese, sausage links. Beverages of choice include water and occassional soda.    Nutrition Diagnosis      Nutrition Diagnosis  Patient has Nutrition Diagnosis: Yes  Diagnosis Status (1): Ongoing  Nutrition Diagnosis 1: Altered GI function  Related to (1): EC fistula  As Evidenced by (1): food contents leaking from EC fistula; MD notes    Nutrition Interventions/Recommendations   Food and Nutrition Delivery  Medical Food Supplement: Other, Specify:  Goals: Pending insurance approval, pt will consume [employer] Peptide 1.5 once daily (500 kcal, 24 g PRO). She was encouraged to ingest slowly.    Nutrition Monitoring and Evaluation   Food/Nutrient Related History Monitoring  Monitoring and Evaluation Plan: " Enteral and parenteral nutrition intake  Enteral and Parenteral Nutrition Intake: Parenteral nutrition formula/solution  Criteria: Pending insurance approval, pt will consume [employer] Peptide 1.5 once daily.  Body Composition/Growth/Weight History  Monitoring and Evaluation Plan: Weight  Weight: Usual stated body weight (UBW)  Biochemical Data, Medical Tests and Procedures  Monitoring and Evaluation Plan: Nutritional anemia profile, Vitamin profile, Electrolyte/renal panel    Follow-up   Progress will be reassessed in 1 month. The estimated number of remaining follow-ups at this time: 6+ at a frequency of ~Q1-3 months. The total number and frequency of remaining follow-ups may change depending on patient's progress.     Time spent with patient: 60 minutes of which 50 percent or greater was spent counseling and or coordinating care.

## 2024-03-12 DIAGNOSIS — B20 HUMAN IMMUNODEFICIENCY VIRUS (MULTI): ICD-10-CM

## 2024-03-12 RX ORDER — TENOFOVIR ALAFENAMIDE 25 MG/1
25 TABLET ORAL DAILY
Qty: 30 TABLET | Refills: 5 | Status: SHIPPED | OUTPATIENT
Start: 2024-03-12

## 2024-04-05 ENCOUNTER — TELEPHONE (OUTPATIENT)
Dept: IMMUNOLOGY | Facility: CLINIC | Age: 49
End: 2024-04-05
Payer: COMMERCIAL

## 2024-04-05 NOTE — TELEPHONE ENCOUNTER
Time send: 5 minutes    CHW called to check in, Veena is doing well, discloses med adherence.   pt mentioned that dietitian Steven was supposed to mail some milk.   CHW transferred call to Steven.   Pt has no concerns or comment at this time.   CHW will F/u as needed

## 2024-04-12 DIAGNOSIS — Z21 ASYMPTOMATIC HIV INFECTION, WITH NO HISTORY OF HIV-RELATED ILLNESS (MULTI): Chronic | ICD-10-CM

## 2024-04-12 DIAGNOSIS — I10 HYPERTENSION, UNSPECIFIED TYPE: ICD-10-CM

## 2024-04-15 DIAGNOSIS — I10 HYPERTENSION, UNSPECIFIED TYPE: ICD-10-CM

## 2024-04-15 RX ORDER — EMTRICITABINE 200 MG/1
200 CAPSULE ORAL DAILY
Qty: 30 CAPSULE | Refills: 5 | Status: SHIPPED | OUTPATIENT
Start: 2024-04-15

## 2024-04-15 RX ORDER — DOLUTEGRAVIR SODIUM 50 MG/1
TABLET, FILM COATED ORAL
Qty: 30 TABLET | Refills: 5 | Status: SHIPPED | OUTPATIENT
Start: 2024-04-15

## 2024-04-15 RX ORDER — AMLODIPINE BESYLATE 10 MG/1
10 TABLET ORAL DAILY
Qty: 30 TABLET | Refills: 5 | Status: SHIPPED | OUTPATIENT
Start: 2024-04-15

## 2024-04-15 RX ORDER — HYDROCHLOROTHIAZIDE 12.5 MG/1
12.5 CAPSULE ORAL EVERY MORNING
Qty: 30 CAPSULE | Refills: 5 | Status: SHIPPED | OUTPATIENT
Start: 2024-04-15 | End: 2024-10-12

## 2024-05-14 DIAGNOSIS — D50.9 IRON DEFICIENCY ANEMIA, UNSPECIFIED IRON DEFICIENCY ANEMIA TYPE: ICD-10-CM

## 2024-05-14 RX ORDER — FERROUS SULFATE TAB 325 MG (65 MG ELEMENTAL FE) 325 (65 FE) MG
1 TAB ORAL
Qty: 90 TABLET | Refills: 2 | Status: SHIPPED | OUTPATIENT
Start: 2024-05-14

## 2024-05-23 ENCOUNTER — TELEPHONE (OUTPATIENT)
Dept: IMMUNOLOGY | Facility: CLINIC | Age: 49
End: 2024-05-23
Payer: COMMERCIAL

## 2024-06-11 DIAGNOSIS — K63.2 ENTEROCUTANEOUS FISTULA: ICD-10-CM

## 2024-06-19 ENCOUNTER — OFFICE VISIT (OUTPATIENT)
Dept: IMMUNOLOGY | Facility: CLINIC | Age: 49
End: 2024-06-19
Payer: COMMERCIAL

## 2024-06-19 ENCOUNTER — NUTRITION (OUTPATIENT)
Dept: IMMUNOLOGY | Facility: CLINIC | Age: 49
End: 2024-06-19

## 2024-06-19 ENCOUNTER — DOCUMENTATION (OUTPATIENT)
Dept: IMMUNOLOGY | Facility: CLINIC | Age: 49
End: 2024-06-19
Payer: COMMERCIAL

## 2024-06-19 VITALS
OXYGEN SATURATION: 100 % | TEMPERATURE: 97.8 F | BODY MASS INDEX: 29.68 KG/M2 | WEIGHT: 173.85 LBS | HEART RATE: 55 BPM | RESPIRATION RATE: 16 BRPM | DIASTOLIC BLOOD PRESSURE: 74 MMHG | SYSTOLIC BLOOD PRESSURE: 108 MMHG | HEIGHT: 64 IN

## 2024-06-19 DIAGNOSIS — E55.9 VITAMIN D DEFICIENCY: ICD-10-CM

## 2024-06-19 DIAGNOSIS — I26.99 OTHER ACUTE PULMONARY EMBOLISM WITHOUT ACUTE COR PULMONALE (MULTI): ICD-10-CM

## 2024-06-19 DIAGNOSIS — Z12.31 BREAST CANCER SCREENING BY MAMMOGRAM: ICD-10-CM

## 2024-06-19 DIAGNOSIS — B20 HIV INFECTION, UNSPECIFIED SYMPTOM STATUS (MULTI): Primary | ICD-10-CM

## 2024-06-19 DIAGNOSIS — R45.86 MOOD AND AFFECT DISTURBANCE: ICD-10-CM

## 2024-06-19 DIAGNOSIS — K63.2 ENTEROCUTANEOUS FISTULA: ICD-10-CM

## 2024-06-19 DIAGNOSIS — Z00.00 HEALTHCARE MAINTENANCE: ICD-10-CM

## 2024-06-19 LAB
ALBUMIN SERPL BCP-MCNC: 2.5 G/DL (ref 3.4–5)
ALP SERPL-CCNC: 307 U/L (ref 33–110)
ALT SERPL W P-5'-P-CCNC: 5 U/L (ref 7–45)
ANION GAP SERPL CALC-SCNC: 9 MMOL/L (ref 10–20)
AST SERPL W P-5'-P-CCNC: 13 U/L (ref 9–39)
BASOPHILS # BLD AUTO: 0.02 X10*3/UL (ref 0–0.1)
BASOPHILS NFR BLD AUTO: 0.3 %
BILIRUB SERPL-MCNC: 0.3 MG/DL (ref 0–1.2)
BUN SERPL-MCNC: 12 MG/DL (ref 6–23)
CALCIUM SERPL-MCNC: 7.4 MG/DL (ref 8.6–10.6)
CD3+CD4+ CELLS # BLD: 0.75 X10E9/L
CD3+CD4+ CELLS # BLD: 749 /MM3
CD3+CD4+ CELLS NFR BLD: 39 %
CD3+CD4+ CELLS/CD3+CD8+ CLL BLD: 1.15 %
CD3+CD8+ CELLS # BLD: 0.65 X10E9/L
CD3+CD8+ CELLS NFR BLD: 34 %
CHLORIDE SERPL-SCNC: 110 MMOL/L (ref 98–107)
CO2 SERPL-SCNC: 21 MMOL/L (ref 21–32)
CREAT SERPL-MCNC: 1.57 MG/DL (ref 0.5–1.05)
EGFRCR SERPLBLD CKD-EPI 2021: 41 ML/MIN/1.73M*2
EOSINOPHIL # BLD AUTO: 0.29 X10*3/UL (ref 0–0.7)
EOSINOPHIL NFR BLD AUTO: 4.3 %
ERYTHROCYTE [DISTWIDTH] IN BLOOD BY AUTOMATED COUNT: 21 % (ref 11.5–14.5)
GLUCOSE SERPL-MCNC: 105 MG/DL (ref 74–99)
HCT VFR BLD AUTO: 30.4 % (ref 36–46)
HGB BLD-MCNC: 9.3 G/DL (ref 12–16)
HYPOCHROMIA BLD QL SMEAR: NORMAL
IMM GRANULOCYTES # BLD AUTO: 0.03 X10*3/UL (ref 0–0.7)
IMM GRANULOCYTES NFR BLD AUTO: 0.4 % (ref 0–0.9)
LYMPHOCYTES # BLD AUTO: 1.92 X10*3/UL (ref 1.2–4.8)
LYMPHOCYTES # SPEC AUTO: 1.92 X10*3/UL
LYMPHOCYTES NFR BLD AUTO: 28.7 %
MCH RBC QN AUTO: 25 PG (ref 26–34)
MCHC RBC AUTO-ENTMCNC: 30.6 G/DL (ref 32–36)
MCV RBC AUTO: 82 FL (ref 80–100)
MONOCYTES # BLD AUTO: 0.5 X10*3/UL (ref 0.1–1)
MONOCYTES NFR BLD AUTO: 7.5 %
NEUTROPHILS # BLD AUTO: 3.92 X10*3/UL (ref 1.2–7.7)
NEUTROPHILS NFR BLD AUTO: 58.8 %
NRBC BLD-RTO: 0 /100 WBCS (ref 0–0)
OVALOCYTES BLD QL SMEAR: NORMAL
PLATELET # BLD AUTO: 318 X10*3/UL (ref 150–450)
POTASSIUM SERPL-SCNC: 4 MMOL/L (ref 3.5–5.3)
PROT SERPL-MCNC: 7 G/DL (ref 6.4–8.2)
RBC # BLD AUTO: 3.72 X10*6/UL (ref 4–5.2)
RBC MORPH BLD: NORMAL
SODIUM SERPL-SCNC: 136 MMOL/L (ref 136–145)
TSH SERPL-ACNC: 1.52 MIU/L (ref 0.44–3.98)
WBC # BLD AUTO: 6.7 X10*3/UL (ref 4.4–11.3)

## 2024-06-19 PROCEDURE — 3078F DIAST BP <80 MM HG: CPT | Performed by: INTERNAL MEDICINE

## 2024-06-19 PROCEDURE — 84443 ASSAY THYROID STIM HORMONE: CPT | Performed by: INTERNAL MEDICINE

## 2024-06-19 PROCEDURE — 87536 HIV-1 QUANT&REVRSE TRNSCRPJ: CPT | Performed by: INTERNAL MEDICINE

## 2024-06-19 PROCEDURE — 3074F SYST BP LT 130 MM HG: CPT | Performed by: INTERNAL MEDICINE

## 2024-06-19 PROCEDURE — 99214 OFFICE O/P EST MOD 30 MIN: CPT | Performed by: INTERNAL MEDICINE

## 2024-06-19 PROCEDURE — 1036F TOBACCO NON-USER: CPT | Performed by: INTERNAL MEDICINE

## 2024-06-19 PROCEDURE — 88185 FLOWCYTOMETRY/TC ADD-ON: CPT | Mod: TC | Performed by: INTERNAL MEDICINE

## 2024-06-19 PROCEDURE — 80053 COMPREHEN METABOLIC PANEL: CPT | Performed by: INTERNAL MEDICINE

## 2024-06-19 PROCEDURE — 85025 COMPLETE CBC W/AUTO DIFF WBC: CPT | Performed by: INTERNAL MEDICINE

## 2024-06-19 PROCEDURE — 3008F BODY MASS INDEX DOCD: CPT | Performed by: INTERNAL MEDICINE

## 2024-06-19 RX ORDER — BISMUTH SUBSALICYLATE 262 MG
1 TABLET,CHEWABLE ORAL DAILY
Qty: 30 TABLET | Refills: 5 | Status: SHIPPED | OUTPATIENT
Start: 2024-06-19

## 2024-06-19 RX ORDER — ARIPIPRAZOLE 5 MG/1
TABLET ORAL
COMMUNITY
Start: 2018-07-24

## 2024-06-19 RX ORDER — MIRTAZAPINE 7.5 MG/1
TABLET, FILM COATED ORAL
COMMUNITY
Start: 2024-06-04

## 2024-06-19 ASSESSMENT — PAIN SCALES - GENERAL: PAINLEVEL: 9

## 2024-06-19 NOTE — PROGRESS NOTES
Time spend: 20 minutes    CHW met with pt at MD  appointment.   Pt is having some knee problem.   Worried that she will have to go to nursing home due to her condition.   Pt is living with her kids at this time.   Pt reports medication adherence.  CHW will follow up as needed.

## 2024-06-19 NOTE — PROGRESS NOTES
HIV Clinic Follow-up Visit:    Veena Garza was last seen in [facility] on 5/23/2024    Missed antiretroviral doses in last 72 hours? Missed 2 days last week    Sexually active? 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 Has been doing ok since her last visit, feeling overwhelmed w/ her medical issues and chronic fistula. Did not go to her last 3 appts w/ surgery. She notes she missed 2 days of meds last week including ART due to feeling depressed. Denies SI/HI. Has been going to counseling however and feels like this has been helpful. Her 7 year old daughter is also staying with her mom temporarily and this has helped relieve some stress for a brief time. She is at home w/ her two older sons who help her.     Other main issue today is b/l knee pain which has been chronic; not relieved w/ tylenol. States she previously was getting knee injections but these only worked for 1 day. No F/C, appetite is ok. Not taking apixaban as she states her refills ran out.     Also c/o stress incontinence, and her knee pain makes it even harder to get to the toilet which has been upsetting to her. Did not take her hydrochlorothiazide today because of it-and her bp is fine, so will d/c    Still dealing w/ her chronic abdominal ECF; states she keeps it open to air during the day and covers it w/ a washcloth and dressing when she goes out. Does not have home health care anymore and doesn't have wound care supplies. No F/C, no pain at the site, no malodor.     Has 2 meds she does not know what they are so has not been taking-unfortunately appears one was her emtricitabine  Review of Systems  Review of Systems - as above     CURRENT MEDICATIONS:    Current Outpatient Medications:     albuterol 90 mcg/actuation inhaler, Inhale 1-2 puffs every 6 hours if needed., Disp: , Rfl:  "    amLODIPine (Norvasc) 10 mg tablet, TAKE 1 TABLET(10 MG) BY MOUTH EVERY DAY, Disp: 30 tablet, Rfl: 5    ARIPiprazole (Abilify) 5 mg tablet, Take by mouth once daily., Disp: , Rfl:     brexpiprazole 3 mg tablet, Take 3 mg by mouth., Disp: , Rfl:     diclofenac sodium (Voltaren) 1 % gel, Apply 4.5 inches (4 g) topically 2 times a day as needed (for knee pain)., Disp: 240 g, Rfl: 3    emtricitabine (Emtriva) 200 mg capsule, TAKE 1 CAPSULE BY MOUTH EVERY DAY, Disp: 30 capsule, Rfl: 5    esomeprazole (NexIUM) 40 mg DR capsule, Take one capsule in the morning and one capsule at bedtime every day.  Do not open capsule., Disp: 60 capsule, Rfl: 3    FeroSuL tablet, TAKE 1 TABLET BY MOUTH THREE TIMES DAILY WITH MEALS, Disp: 90 tablet, Rfl: 2    mirtazapine (Remeron) 7.5 mg tablet, , Disp: , Rfl:     Tivicay 50 mg tablet, TAKE 1 TABLET(50 MG) BY MOUTH EVERY DAY WITH A MEAL, Disp: 30 tablet, Rfl: 5    Vemlidy 25 mg tablet tablet, TAKE 1 TABLET BY MOUTH DAILY, Disp: 30 tablet, Rfl: 5    acetaminophen (Tylenol) 500 mg tablet, Take 1 tablet (500 mg) by mouth 3 times a day as needed., Disp: , Rfl:     apixaban (Eliquis) 5 mg tablet, TAKE 1 TABLET BY MOUTH EVERY 12 HOURS., Disp: 60 tablet, Rfl: 5    cholecalciferol (Vitamin D-3) 1,250 mcg (50,000 unit) wafer, Take 1 Wafer (50,000 Units) by mouth every 7 days., Disp: 4 Wafer, Rfl: 5    cyanocobalamin (Vitamin B-12) 500 mcg tablet, Take by mouth., Disp: , Rfl:     desvenlafaxine (Pristiq) 50 mg 24 hr tablet, TAKE 1 TABLET BY MOUTH AT BEDTIME, Disp: 30 tablet, Rfl: 0    mirtazapine (Remeron) 15 mg tablet, , Disp: , Rfl:     multivitamin tablet, TAKE 1 TABLET BY MOUTH ONCE DAILY, Disp: 30 tablet, Rfl: 5    PHYSICAL EXAMINATION:  Visit Vitals  /74 (BP Location: Right arm, Patient Position: Sitting, BP Cuff Size: Large adult)   Pulse 55   Temp 36.6 °C (97.8 °F) (Temporal)   Resp 16   Ht 1.626 m (5' 4\")   Wt 78.9 kg (173 lb 13.6 oz)   SpO2 100%   BMI 29.84 kg/m²   OB Status Having " periods   Smoking Status Never   BSA 1.89 m²       Physical Exam   Frail, chronically ill appearing middle aged woman appearing older than stated age, NAD  MMM, edentulous   Lungs CTA anteriorly and posteriorly  RRR, S1/S2, no murmurs  Abd w/ 1-2cm open fistula draining food contents, no gross redness around the fistula, no malodor  Thin extremities, trace edema in LE; tight shiny skin on b/l shins, dry skin; some muscle wasting in b/l UE and LE  Normal mood and affect, tearful at times when discussing her medical care     PERTINENT DATA:  CBC:  WBC   Date Value Ref Range Status   06/19/2024 6.7 4.4 - 11.3 x10*3/uL Final     nRBC   Date Value Ref Range Status   06/19/2024 0.0 0.0 - 0.0 /100 WBCs Final     RBC   Date Value Ref Range Status   06/19/2024 3.72 (L) 4.00 - 5.20 x10*6/uL Final     Hemoglobin   Date Value Ref Range Status   06/19/2024 9.3 (L) 12.0 - 16.0 g/dL Final     MCV   Date Value Ref Range Status   06/19/2024 82 80 - 100 fL Final     RDW   Date Value Ref Range Status   06/19/2024 21.0 (H) 11.5 - 14.5 % Final       Renal Function Panel:  Glucose   Date Value Ref Range Status   06/19/2024 105 (H) 74 - 99 mg/dL Final     Sodium   Date Value Ref Range Status   06/19/2024 136 136 - 145 mmol/L Final     Potassium   Date Value Ref Range Status   06/19/2024 4.0 3.5 - 5.3 mmol/L Final     Chloride   Date Value Ref Range Status   06/19/2024 110 (H) 98 - 107 mmol/L Final     HCO3, Arterial   Date Value Ref Range Status   03/01/2023 22.0 22.0 - 26.0 mmol/L Final     Anion Gap   Date Value Ref Range Status   06/19/2024 9 (L) 10 - 20 mmol/L Final     Urea Nitrogen   Date Value Ref Range Status   06/19/2024 12 6 - 23 mg/dL Final     Creatinine   Date Value Ref Range Status   06/19/2024 1.57 (H) 0.50 - 1.05 mg/dL Final     eGFR   Date Value Ref Range Status   06/19/2024 41 (L) >60 mL/min/1.73m*2 Final     Comment:     Calculations of estimated GFR are performed using the 2021 CKD-EPI Study Refit equation without the  race variable for the IDMS-Traceable creatinine methods.  https://jasn.asnjournals.org/content/early/2021/09/22/ASN.4989127766     Calcium   Date Value Ref Range Status   06/19/2024 7.4 (L) 8.6 - 10.6 mg/dL Final     Phosphorus   Date Value Ref Range Status   09/25/2023 2.8 2.5 - 4.9 mg/dL Final     Comment:      The performance characteristics of phosphorus testing in   heparinized plasma have been validated by the individual     laboratory site where testing is performed. Testing    on heparinized plasma is not approved by the FDA;    however, such approval is not necessary.       Albumin   Date Value Ref Range Status   06/19/2024 2.5 (L) 3.4 - 5.0 g/dL Final       Hepatic Panel:  Albumin   Date Value Ref Range Status   06/19/2024 2.5 (L) 3.4 - 5.0 g/dL Final     Bilirubin, Total   Date Value Ref Range Status   06/19/2024 0.3 0.0 - 1.2 mg/dL Final     Bilirubin, Direct   Date Value Ref Range Status   08/07/2023 0.2 0.0 - 0.3 mg/dL Final   08/07/2023 CANCELED       Comment:     Result canceled by the ancillary.     Alkaline Phosphatase   Date Value Ref Range Status   06/19/2024 307 (H) 33 - 110 U/L Final     ALT   Date Value Ref Range Status   06/19/2024 5 (L) 7 - 45 U/L Final     Comment:     Patients treated with Sulfasalazine may generate falsely decreased results for ALT.       HIV Viral Load:  HIV-1 RNA PCR Viral Load Log   Date Value Ref Range Status   02/08/2024   Final     Comment:     Not calculated     HIV RNA Result   Date Value Ref Range Status   02/08/2024 Not Detected Not Detected Final       CD4 Count:  CD3+CD4+%   Date Value Ref Range Status   02/08/2024 43 29 - 57 % Final     CD3+CD4+ Absolute   Date Value Ref Range Status   02/08/2024 0.903 0.350 - 2.740 x10E9/L Final     CD3+CD8+%   Date Value Ref Range Status   02/08/2024 30 7 - 31 % Final     CD3+CD8+ Absolute   Date Value Ref Range Status   02/08/2024 0.630 0.080 - 1.490 x10E9/L Final     CD4/CD8 Ratio   Date Value Ref Range Status    02/08/2024 1.43 1.00 - 2.60 Final     CD45%   Date Value Ref Range Status   05/16/2023 100 % Final       CRCL:  Urine Volume   Date Value Ref Range Status   07/01/2021 630 mL Final     Creatinine, Urine   Date Value Ref Range Status   08/14/2023 62.1 20.0 - 320.0 mg/dL Final     Creatinine, 24H Ur   Date Value Ref Range Status   07/01/2021 0.68 0.67 - 1.59 g/24h Final       Lipid Panel:  HDL   Date Value Ref Range Status   09/14/2022 22.3 (A) mg/dL Final     Comment:     .      AGE      VERY LOW   LOW     NORMAL    HIGH       0-19 Y       < 35   < 40     40-45     ----    20-24 Y       ----   < 40       >45     ----      >24 Y       ----   < 40     40-60      >60  .       Cholesterol/HDL Ratio   Date Value Ref Range Status   09/14/2022 2.8  Final     Comment:     REF VALUES  DESIRABLE  < 3.4  HIGH RISK  > 5.0       LDL   Date Value Ref Range Status   09/14/2022 28 0 - 99 mg/dL Final     Comment:     .                           NEAR      BORD      AGE      DESIRABLE  OPTIMAL    HIGH     HIGH     VERY HIGH     0-19 Y     0 - 109     ---    110-129   >/= 130     ----    20-24 Y     0 - 119     ---    120-159   >/= 160     ----      >24 Y     0 -  99   100-129  130-159   160-189     >/=190  .       VLDL   Date Value Ref Range Status   09/14/2022 13 0 - 40 mg/dL Final     Triglycerides   Date Value Ref Range Status   09/25/2023 73 0 - 149 mg/dL Final     Comment:     .      AGE      DESIRABLE   BORDERLINE HIGH   HIGH     VERY HIGH   0 D-90 D    19 - 174         ----         ----        ----  91 D- 9 Y     0 -  74        75 -  99     >/= 100      ----    10-19 Y     0 -  89        90 - 129     >/= 130      ----    20-24 Y     0 - 114       115 - 149     >/= 150      ----         >24 Y     0 - 149       150 - 199    200- 499    >/= 500  .   Venipuncture immediately after or during the    administration of Metamizole may lead to falsely   low results. Testing should be performed immediately   prior to Metamizole dosing.          HgbA1c:  Hemoglobin A1C   Date Value Ref Range Status   01/29/2019 5.3 % Final     Comment:          Diagnosis of Diabetes-Adults   Non-Diabetic: < or = 5.6%   Increased risk for developing diabetes: 5.7-6.4%   Diagnostic of diabetes: > or = 6.5%  .       Monitoring of Diabetes                Age (y)     Therapeutic Goal (%)   Adults:          >18           <7.0   Pediatrics:    13-18           <7.5                   7-12           <8.0                   0- 6            7.5-8.5   American Diabetes Association. Diabetes Care 33(S1), Jan 2010.         The ASCVD Risk score (Aldo PRATT, et al., 2019) failed to calculate for the following reasons:    The patient has a prior MI or stroke diagnosis    ASSESSMENT / PLAN:    Problem List Items Addressed This Visit       Enterocutaneous fistula    Current Assessment & Plan     Persistent, still draining food contents. Pt did not show to her last appts w/ Gen Surg and admits being overwhelmed w/ her medical conditions, but is agreeable to going again         HIV infection (Multi) - Primary (Chronic)    Current Assessment & Plan     Stable on current med regimen 3TC/dolutegravir/TAF for ease of swallowing. Last VL UD, CD4 903. Unfortunately has not been taking her FTC as she did not recognize it . Will check genotype today, and ask pharmD to f/up with her to review meds.         Relevant Orders    CBC and Auto Differential (Completed)    CD4/8 Panel    HIV RNA, quantitative, PCR    Comprehensive metabolic panel (Completed)    Morphology (Completed)    HIV Genotype Assay    Pulmonary embolism (Multi)    Relevant Medications    apixaban (Eliquis) 5 mg tablet    Vitamin D deficiency    Current Assessment & Plan     Renewed 50k weekly which she says was not taking-I think this may be the other med she didn't recognize         Relevant Medications    cholecalciferol (Vitamin D-3) 1,250 mcg (50,000 unit) wafer    multivitamin tablet     Other Visit Diagnoses       Mood and  affect disturbance        Relevant Orders    TSH with reflex to Free T4 if abnormal (Completed)    Healthcare maintenance        Relevant Orders    BI mammo bilateral screening tomosynthesis    Breast cancer screening by mammogram        Relevant Orders    BI mammo bilateral screening tomosynthesis            Health maintenance  - Noncancer screening (e.g., HTN, T2DM, DLD, AAA): BP within goal today, lipids 9/2023  - Cancer screening (e.g., lung, colorectal, breast, cervical): Last Pap 2019 w/ ASCUS, due now and pt agreeable to going, will request appt; Last colonoscopy 2012 w/ poor prep, needs repeat which pt wants to defer for now; Last mammogram 2018, due now and pt agreeable to completing  - STI screening (e.g. HCV, syphilis, GC/CT):   - Immunizations (e.g., influenza, shingles, Tdap, pneumonia, COVID-19): s/p 2 COVID vaccines, will consider getting the updated booster  - Substance use (e.g., smoking, EtOH, marijuana, recr drugs): No tobacco use, ETOH or recr drugs   - Dental: Needs to call for new dentures, plans to make appt         Return to clinic in 3 months     Shelly Delgado DO  Infectious Disease Fellow, PGY5  I saw and evaluated the patient. I personally obtained the key and critical portions of the history and physical exam or was physically present for key and critical portions performed by the resident/fellow. I reviewed the resident/fellow's documentation and discussed the patient with the resident/fellow. I agree with the resident/fellow's medical decision making as documented in the note.  With edits inclded above    Marjorie Napoles MD

## 2024-06-19 NOTE — ASSESSMENT & PLAN NOTE
Stable on current med regimen 3TC/dolutegravir/TAF for ease of swallowing. Last VL UD, CD4 903. Unfortunately has not been taking her FTC as she did not recognize it . Will check genotype today, and ask pharmD to f/up with her to review meds.

## 2024-06-19 NOTE — ASSESSMENT & PLAN NOTE
Renewed 50k weekly which she says was not taking-I think this may be the other med she didn't recognize

## 2024-06-19 NOTE — ASSESSMENT & PLAN NOTE
Persistent, still draining food contents. Pt did not show to her last appts w/ Gen Surg and admits being overwhelmed w/ her medical conditions, but is agreeable to going again

## 2024-06-20 VITALS — HEIGHT: 64 IN | BODY MASS INDEX: 29.84 KG/M2

## 2024-06-20 DIAGNOSIS — E55.9 VITAMIN D DEFICIENCY: ICD-10-CM

## 2024-06-20 DIAGNOSIS — E55.9 VITAMIN D DEFICIENCY: Primary | ICD-10-CM

## 2024-06-20 RX ORDER — ASPIRIN 325 MG
50000 TABLET, DELAYED RELEASE (ENTERIC COATED) ORAL
Qty: 4 CAPSULE | Refills: 5 | Status: SHIPPED | OUTPATIENT
Start: 2024-06-23 | End: 2024-06-20 | Stop reason: SDUPTHER

## 2024-06-20 RX ORDER — ASPIRIN 325 MG
50000 TABLET, DELAYED RELEASE (ENTERIC COATED) ORAL
Qty: 4 CAPSULE | Refills: 5 | Status: SHIPPED | OUTPATIENT
Start: 2024-06-20

## 2024-06-20 NOTE — PROGRESS NOTES
"Nutrition Assessment     Reason for Visit:  Veena Garza is a 49 y.o. female who was seen today for follow-up.     Anthropometrics:  Anthropometrics  Height: 162.6 cm (5' 4\")  Weight: 173.8#   BMI: 29.8  Weight Change: 2.7# weight loss in the past 4 months.   Weight Hx:   02/08/24: 170.6#  02/01/24: 168.4#   11/22/23: 140#      Food And Nutrient Intake:  Food and Nutrient History  Food and Nutrient History: Pt was seen today for follow-up. CMN for [employer] Peptide 1.5 once daily was denied by pt's insurance. She continues with chronic ECF (missed last three appointments with surgery). Folate, B12, ferritin, mag WNL. Vit D 11 on 2/8 (pt hadn't been taking; 50k weekly renewed today per MD). In review of current intake, pt reports improved appetite/intake and averages 3 meals/day. For breakfast, she may have 3 eggs, 2 sausage. For lunch/dinner she may have a turkey sandwich/burger or baked chicken or baked pork chop with baked onion rings or fries. Snacks: popcorn. She noted that she had been eating only half a turkey sandwich and is now able to finish 100%. Primary beverage option is water. However she noted that she may have a regular soda at lunch and/or dinner which increases fistula output per pt.     Energy Needs  Calculated Energy Needs Using Equations  Height: 162.6 cm (5' 4\")  Weight Used for Equation Calculations: 78.8 kg (173 lb 12.8 oz)  Reymundo He Equation (Overweight or Obese Patients): 1403  Equation Chosen to Use by RD: Klesie Hays  Activity Factor: 1.1  Stress Factor: 1.2  Total Energy Needs: 1850  Estimated Protein Needs  Total Protein Estimated Needs (g): 80 g    Nutrition Diagnosis      Nutrition Diagnosis  Patient has Nutrition Diagnosis: Yes  Diagnosis Status (1): Ongoing  Nutrition Diagnosis 1: Altered GI function  Related to (1): EC fistula  As Evidenced by (1): food contents leaking from EC fistula; MD notes    Nutrition Interventions/Recommendations   Food and Nutrition " Delivery  Meals & Snacks: Protein-modified diet, Vitamin-modified diet  Goals: Pt was encouraged to ensure lean protein at main meals, thoroughly chew food contents, limit intake of regular soda if not able to avoid, and take MVI, Vit D, FeroSuL as prescribed daily.    Nutrition Monitoring and Evaluation   Food/Nutrient Related History Monitoring  Monitoring and Evaluation Plan: Vitamin intake, Mineral/element intake, Amount of food, Protein intake  Body Composition/Growth/Weight History  Monitoring and Evaluation Plan: Weight  Weight: Usual stated body weight (UBW)  Biochemical Data, Medical Tests and Procedures  Monitoring and Evaluation Plan: Nutritional anemia profile, Vitamin profile, Electrolyte/renal panel    Follow-up   Progress will be reassessed in 1 month. The estimated number of remaining follow-ups at this time: 6+ at a frequency of ~Q1 month. The total number and frequency of remaining follow-ups may change depending on patient's progress. The total number and frequency of remaining follow-ups may change depending on patient's progress.     Time spent with patient: 45 minutes of which 50 percent or greater was spent counseling and or coordinating care.

## 2024-06-21 LAB
HIV1 RNA # PLAS NAA DL=20: 33 COPIES/ML
HIV1 RNA # PLAS NAA DL=20: DETECTED {COPIES}/ML
HIV1 RNA SPEC NAA+PROBE-LOG#: 1.52 LOG10 CPY/ML

## 2024-07-05 RX ORDER — ESOMEPRAZOLE MAGNESIUM 40 MG/1
CAPSULE, DELAYED RELEASE ORAL
Qty: 60 CAPSULE | Refills: 3 | Status: SHIPPED | OUTPATIENT
Start: 2024-07-05

## 2024-07-16 ENCOUNTER — APPOINTMENT (OUTPATIENT)
Dept: SURGERY | Facility: CLINIC | Age: 49
End: 2024-07-16
Payer: COMMERCIAL

## 2024-07-23 ENCOUNTER — TELEPHONE (OUTPATIENT)
Dept: IMMUNOLOGY | Facility: CLINIC | Age: 49
End: 2024-07-23
Payer: COMMERCIAL

## 2024-07-30 NOTE — PROGRESS NOTES
[facility]  TRAUMA CLINIC PROGRESS NOTE    Patient Name: Veena Garza  MRN: [id number]  Admit Date:   : 1975  AGE: 49 y.o.   GENDER: female  ==============================================================================  CHIEF COMPLAINT:   Follow up from 2023- ECF fistula management      OTHER MEDICAL PROBLEMS:  HIV   Bacteremia   CKD  Asthma   GERD  JASMIN  Hep B     INCIDENTAL FINDINGS:  NA     PROCEDURES:  23 I&D washout anterior abdominal wall   3/3/23 abdominal wall wound washout, partial closure, wound vac placement        PATHOLOGY:  NA  ==============================================================================  TODAY'S ASSESSMENT AND PLAN OF CARE:  PRE-OPERATIVE NEEDS  - Please make an appointment with your primary care doctor. You should be medically optimized before undergoing any elective surgery. Your primary care doctor will:   A. Make sure that you are taking the medications needed for your HIV and HEP-C and that your blood work is in order.  B. Make sure your nutrition is optimized    2. FOLLOW UP/CALL  - Please call to make an appointment with the trauma/ACS clinic after you have seen your primary care doctor   - Return to clinic or ER sooner if pt. has any development of erythema, drainage, swelling, pain, fevers, or chills  - If you have questions or concerns that are not urgent, please feel free to call  [phone].  - Call [phone] to make additional appointment(s) as needed if unable to reschedule in office today    ==============================================================================  HISTORY OF PRESENT ILLNESS  47 yo female with history of HIV, CKD, Asthma, GERD, JASMIN, HBV, gastric bypass 2016 complicated by abdominal fistula and necrotizing soft tissue infection of the abdominal wall, DVT/PE     At clinic appointment on : patient to the clinic with reports of not taking care of her fistula, concern for gastric  contents pouring out of her fistula and a G tube that she cut the end off of.  She was advised to go to the ED for evaluation.     At her appointment on 02/27/2024 patient presents for wound check. She is eating, drinking, voiding and having flatus, bowel movements. She has been doing her own dressing changes at home. Wound care at this time was dry abd daily changes or PRN saturation.    Patient is eating, drinking, voiding and having flatus, bowel movements.   MEDICAL HISTORY / ROS:  Admission history and ROS reviewed.   Patient denies:  fevers; chills; headache;  dizziness; chest pain; shortness of breath; nausea/vomiting/diarrhea/constipation; new/worsening abdominal pain or numbness/tingling/weakness of extremities.   Pertinent changes as follows:  NA    PHYSICAL EXAM:  GCS 15, A+OX3, RRR, S1, S2, CTA=, no increased WOB. Abd soft, nt, nd. MAEx4, CARLOS 5/5 x4, no extremity edema noted. 2+pp. Please see photo I'm chart for chronic non-healing ECF.      LABS:  No results found for this or any previous visit (from the past 24 hour(s)).  MEDICATIONS:  Current Outpatient Medications   Medication Sig Dispense Refill    acetaminophen (Tylenol) 500 mg tablet Take 1 tablet (500 mg) by mouth 3 times a day as needed.      albuterol 90 mcg/actuation inhaler Inhale 1-2 puffs every 6 hours if needed.      amLODIPine (Norvasc) 10 mg tablet TAKE 1 TABLET(10 MG) BY MOUTH EVERY DAY 30 tablet 5    apixaban (Eliquis) 5 mg tablet TAKE 1 TABLET BY MOUTH EVERY 12 HOURS. 60 tablet 5    ARIPiprazole (Abilify) 5 mg tablet Take by mouth once daily.      brexpiprazole 3 mg tablet Take 3 mg by mouth.      cholecalciferol (Vitamin D-3) 50,000 unit capsule Take 1 capsule (50,000 Units) by mouth 1 (one) time per week. 4 capsule 5    cyanocobalamin (Vitamin B-12) 500 mcg tablet Take by mouth.      desvenlafaxine (Pristiq) 50 mg 24 hr tablet TAKE 1 TABLET BY MOUTH AT BEDTIME 30 tablet 0    diclofenac sodium (Voltaren) 1 % gel Apply 4.5 inches (4  g) topically 2 times a day as needed (for knee pain). 240 g 3    emtricitabine (Emtriva) 200 mg capsule TAKE 1 CAPSULE BY MOUTH EVERY DAY 30 capsule 5    esomeprazole (NexIUM) 40 mg DR capsule TAKE ONE CAPSULE BY MOUTH EVERY MORNING AND EVERY NIGHT AT BEDTIME 60 capsule 3    FeroSuL tablet TAKE 1 TABLET BY MOUTH THREE TIMES DAILY WITH MEALS 90 tablet 2    mirtazapine (Remeron) 15 mg tablet       mirtazapine (Remeron) 7.5 mg tablet       multivitamin tablet TAKE 1 TABLET BY MOUTH ONCE DAILY 30 tablet 5    Tivicay 50 mg tablet TAKE 1 TABLET(50 MG) BY MOUTH EVERY DAY WITH A MEAL 30 tablet 5    Vemlidy 25 mg tablet tablet TAKE 1 TABLET BY MOUTH DAILY 30 tablet 5     No current facility-administered medications for this visit.       IMAGING SUMMARY:  (summary of new imaging findings, not a copy of dictation)  NA    I have reviewed all laboratory and imaging results ordered/pertinent for today's encounter.

## 2024-08-01 ENCOUNTER — DOCUMENTATION (OUTPATIENT)
Dept: IMMUNOLOGY | Facility: CLINIC | Age: 49
End: 2024-08-01
Payer: COMMERCIAL

## 2024-08-01 NOTE — PROGRESS NOTES
RN called and spoke to patient as had to reschedule gyn appointment to October 9, 2024 at 11am.  Patient was agreeable to new appointment.  RN told her that she will receive a reminder call

## 2024-08-12 DIAGNOSIS — D50.9 IRON DEFICIENCY ANEMIA, UNSPECIFIED IRON DEFICIENCY ANEMIA TYPE: ICD-10-CM

## 2024-08-12 RX ORDER — FERROUS SULFATE TAB 325 MG (65 MG ELEMENTAL FE) 325 (65 FE) MG
1 TAB ORAL
Qty: 90 TABLET | Refills: 2 | Status: SHIPPED | OUTPATIENT
Start: 2024-08-12

## 2024-08-14 ENCOUNTER — APPOINTMENT (OUTPATIENT)
Dept: SURGERY | Facility: CLINIC | Age: 49
End: 2024-08-14
Payer: COMMERCIAL

## 2024-08-14 ENCOUNTER — APPOINTMENT (OUTPATIENT)
Dept: OBSTETRICS AND GYNECOLOGY | Facility: CLINIC | Age: 49
End: 2024-08-14
Payer: COMMERCIAL

## 2024-08-14 VITALS
HEART RATE: 91 BPM | HEIGHT: 64 IN | WEIGHT: 170 LBS | BODY MASS INDEX: 29.02 KG/M2 | DIASTOLIC BLOOD PRESSURE: 68 MMHG | SYSTOLIC BLOOD PRESSURE: 137 MMHG

## 2024-08-14 DIAGNOSIS — K63.2 FISTULA OF INTESTINE: ICD-10-CM

## 2024-08-14 PROCEDURE — 99213 OFFICE O/P EST LOW 20 MIN: CPT | Performed by: PHYSICIAN ASSISTANT

## 2024-08-27 ENCOUNTER — TELEPHONE (OUTPATIENT)
Dept: IMMUNOLOGY | Facility: CLINIC | Age: 49
End: 2024-08-27
Payer: COMMERCIAL

## 2024-08-28 ENCOUNTER — OFFICE VISIT (OUTPATIENT)
Dept: IMMUNOLOGY | Facility: CLINIC | Age: 49
End: 2024-08-28
Payer: COMMERCIAL

## 2024-08-28 ENCOUNTER — NUTRITION (OUTPATIENT)
Dept: IMMUNOLOGY | Facility: CLINIC | Age: 49
End: 2024-08-28
Payer: COMMERCIAL

## 2024-08-28 VITALS
TEMPERATURE: 97.8 F | HEART RATE: 61 BPM | WEIGHT: 171.6 LBS | SYSTOLIC BLOOD PRESSURE: 119 MMHG | DIASTOLIC BLOOD PRESSURE: 76 MMHG | BODY MASS INDEX: 29.3 KG/M2 | OXYGEN SATURATION: 100 % | RESPIRATION RATE: 16 BRPM | HEIGHT: 64 IN

## 2024-08-28 DIAGNOSIS — Z01.818 PREOPERATIVE EXAMINATION: ICD-10-CM

## 2024-08-28 DIAGNOSIS — B20 HIV INFECTION, UNSPECIFIED SYMPTOM STATUS (MULTI): Primary | ICD-10-CM

## 2024-08-28 DIAGNOSIS — Z01.818 PRE-OPERATIVE CLEARANCE: ICD-10-CM

## 2024-08-28 DIAGNOSIS — K63.2 ENTEROCUTANEOUS FISTULA: ICD-10-CM

## 2024-08-28 DIAGNOSIS — N28.89 RENAL MASS: ICD-10-CM

## 2024-08-28 DIAGNOSIS — D50.9 IRON DEFICIENCY ANEMIA, UNSPECIFIED IRON DEFICIENCY ANEMIA TYPE: ICD-10-CM

## 2024-08-28 DIAGNOSIS — K90.89 OTHER SPECIFIED INTESTINAL MALABSORPTION (HHS-HCC): ICD-10-CM

## 2024-08-28 DIAGNOSIS — Z11.3 ROUTINE SCREENING FOR STI (SEXUALLY TRANSMITTED INFECTION): ICD-10-CM

## 2024-08-28 PROBLEM — K90.9 MALABSORPTION (HHS-HCC): Status: ACTIVE | Noted: 2024-08-28

## 2024-08-28 LAB
25(OH)D3 SERPL-MCNC: 17 NG/ML (ref 30–100)
ALBUMIN SERPL BCP-MCNC: 2.4 G/DL (ref 3.4–5)
ALP SERPL-CCNC: 357 U/L (ref 33–110)
ALT SERPL W P-5'-P-CCNC: 8 U/L (ref 7–45)
ANION GAP SERPL CALC-SCNC: 8 MMOL/L (ref 10–20)
AST SERPL W P-5'-P-CCNC: 15 U/L (ref 9–39)
BASOPHILS # BLD AUTO: 0.02 X10*3/UL (ref 0–0.1)
BASOPHILS NFR BLD AUTO: 0.3 %
BILIRUB SERPL-MCNC: 0.2 MG/DL (ref 0–1.2)
BUN SERPL-MCNC: 9 MG/DL (ref 6–23)
C TRACH RRNA SPEC QL NAA+PROBE: NEGATIVE
CALCIUM SERPL-MCNC: 7.7 MG/DL (ref 8.6–10.6)
CD3+CD4+ CELLS # BLD: 0.95 X10E9/L
CD3+CD4+ CELLS # BLD: 954 /MM3
CD3+CD4+ CELLS NFR BLD: 47 %
CD3+CD4+ CELLS/CD3+CD8+ CLL BLD: 1.42 %
CD3+CD8+ CELLS # BLD: 0.67 X10E9/L
CD3+CD8+ CELLS NFR BLD: 33 %
CHLORIDE SERPL-SCNC: 110 MMOL/L (ref 98–107)
CHOLEST SERPL-MCNC: 117 MG/DL (ref 0–199)
CHOLESTEROL/HDL RATIO: 2.9
CO2 SERPL-SCNC: 24 MMOL/L (ref 21–32)
CREAT SERPL-MCNC: 0.94 MG/DL (ref 0.5–1.05)
EGFRCR SERPLBLD CKD-EPI 2021: 75 ML/MIN/1.73M*2
EOSINOPHIL # BLD AUTO: 0.06 X10*3/UL (ref 0–0.7)
EOSINOPHIL NFR BLD AUTO: 0.9 %
ERYTHROCYTE [DISTWIDTH] IN BLOOD BY AUTOMATED COUNT: 16.6 % (ref 11.5–14.5)
FERRITIN SERPL-MCNC: 12 NG/ML (ref 8–150)
GLUCOSE SERPL-MCNC: 75 MG/DL (ref 74–99)
HCT VFR BLD AUTO: 31.9 % (ref 36–46)
HDLC SERPL-MCNC: 40.4 MG/DL
HGB BLD-MCNC: 9.5 G/DL (ref 12–16)
IMM GRANULOCYTES # BLD AUTO: 0.01 X10*3/UL (ref 0–0.7)
IMM GRANULOCYTES NFR BLD AUTO: 0.2 % (ref 0–0.9)
IRON SATN MFR SERPL: 10 % (ref 25–45)
IRON SERPL-MCNC: 23 UG/DL (ref 35–150)
LDLC SERPL CALC-MCNC: 67 MG/DL
LYMPHOCYTES # BLD AUTO: 2.03 X10*3/UL (ref 1.2–4.8)
LYMPHOCYTES # SPEC AUTO: 2.03 X10*3/UL
LYMPHOCYTES NFR BLD AUTO: 31.7 %
MCH RBC QN AUTO: 26.5 PG (ref 26–34)
MCHC RBC AUTO-ENTMCNC: 29.8 G/DL (ref 32–36)
MCV RBC AUTO: 89 FL (ref 80–100)
MONOCYTES # BLD AUTO: 0.45 X10*3/UL (ref 0.1–1)
MONOCYTES NFR BLD AUTO: 7 %
N GONORRHOEA DNA SPEC QL PROBE+SIG AMP: NEGATIVE
NEUTROPHILS # BLD AUTO: 3.83 X10*3/UL (ref 1.2–7.7)
NEUTROPHILS NFR BLD AUTO: 59.9 %
NON HDL CHOLESTEROL: 77 MG/DL (ref 0–149)
NRBC BLD-RTO: 0 /100 WBCS (ref 0–0)
PLATELET # BLD AUTO: 321 X10*3/UL (ref 150–450)
POTASSIUM SERPL-SCNC: 4.2 MMOL/L (ref 3.5–5.3)
PROT SERPL-MCNC: 6.8 G/DL (ref 6.4–8.2)
RBC # BLD AUTO: 3.58 X10*6/UL (ref 4–5.2)
SODIUM SERPL-SCNC: 138 MMOL/L (ref 136–145)
TIBC SERPL-MCNC: 235 UG/DL (ref 240–445)
TREPONEMA PALLIDUM IGG+IGM AB [PRESENCE] IN SERUM OR PLASMA BY IMMUNOASSAY: NONREACTIVE
TRIGL SERPL-MCNC: 47 MG/DL (ref 0–149)
UIBC SERPL-MCNC: 212 UG/DL (ref 110–370)
VLDL: 9 MG/DL (ref 0–40)
WBC # BLD AUTO: 6.4 X10*3/UL (ref 4.4–11.3)

## 2024-08-28 PROCEDURE — 88185 FLOWCYTOMETRY/TC ADD-ON: CPT | Performed by: INTERNAL MEDICINE

## 2024-08-28 PROCEDURE — 86780 TREPONEMA PALLIDUM: CPT | Performed by: INTERNAL MEDICINE

## 2024-08-28 PROCEDURE — 85025 COMPLETE CBC W/AUTO DIFF WBC: CPT | Performed by: INTERNAL MEDICINE

## 2024-08-28 PROCEDURE — 80061 LIPID PANEL: CPT | Performed by: INTERNAL MEDICINE

## 2024-08-28 PROCEDURE — 3078F DIAST BP <80 MM HG: CPT | Performed by: INTERNAL MEDICINE

## 2024-08-28 PROCEDURE — 83540 ASSAY OF IRON: CPT | Performed by: INTERNAL MEDICINE

## 2024-08-28 PROCEDURE — 99214 OFFICE O/P EST MOD 30 MIN: CPT | Performed by: INTERNAL MEDICINE

## 2024-08-28 PROCEDURE — 87491 CHLMYD TRACH DNA AMP PROBE: CPT | Performed by: INTERNAL MEDICINE

## 2024-08-28 PROCEDURE — 87536 HIV-1 QUANT&REVRSE TRNSCRPJ: CPT | Performed by: INTERNAL MEDICINE

## 2024-08-28 PROCEDURE — 82728 ASSAY OF FERRITIN: CPT | Performed by: INTERNAL MEDICINE

## 2024-08-28 PROCEDURE — 80053 COMPREHEN METABOLIC PANEL: CPT | Performed by: INTERNAL MEDICINE

## 2024-08-28 PROCEDURE — 3008F BODY MASS INDEX DOCD: CPT | Performed by: INTERNAL MEDICINE

## 2024-08-28 PROCEDURE — 82306 VITAMIN D 25 HYDROXY: CPT | Performed by: INTERNAL MEDICINE

## 2024-08-28 PROCEDURE — 1036F TOBACCO NON-USER: CPT | Performed by: INTERNAL MEDICINE

## 2024-08-28 PROCEDURE — 3074F SYST BP LT 130 MM HG: CPT | Performed by: INTERNAL MEDICINE

## 2024-08-28 ASSESSMENT — PAIN SCALES - GENERAL: PAINLEVEL: 9

## 2024-08-28 NOTE — PROGRESS NOTES
"Subjective   Veena Garza is a 49 y.o. female who presents to the [facility] for follow-up of HIV infection.   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     The patient notes that overall she has been feeling more fatigued lately. She notes she has been having hot flashes and night sweats lately. The patient notes that she continues to have periods however have been more lighter lately. She states her LKMP was 8/15/24 and \"lasted a normal amount of days.\" She states that she had a regular period on 07/13/24. The patient states that she has been losing weight however on chart review weight appears stable.     Patient also notes that she was recently seen by General Surgery. She notes that they were agreeable to having her fistula fixed. Patient notes surgery is looking for pre-operative clearance.     Objective   Vitals:    08/28/24 0952   BP: 119/76   Pulse: 61   Resp: 16   Temp: 36.6 °C (97.8 °F)   SpO2: 100%        Current Outpatient Medications:     acetaminophen (Tylenol) 500 mg tablet, Take 1 tablet (500 mg) by mouth 3 times a day as needed., Disp: , Rfl:     albuterol 90 mcg/actuation inhaler, Inhale 1-2 puffs every 6 hours if needed., Disp: , Rfl:     amLODIPine (Norvasc) 10 mg tablet, TAKE 1 TABLET(10 MG) BY MOUTH EVERY DAY, Disp: 30 tablet, Rfl: 5    apixaban (Eliquis) 5 mg tablet, TAKE 1 TABLET BY MOUTH EVERY 12 HOURS., Disp: 60 tablet, Rfl: 5    ARIPiprazole (Abilify) 5 mg tablet, Take by mouth once daily., Disp: , Rfl:     diclofenac sodium (Voltaren) 1 % gel, Apply 4.5 inches (4 g) topically 2 times a day as needed (for knee pain)., Disp: 240 g, Rfl: 3    emtricitabine (Emtriva) 200 mg capsule, TAKE 1 CAPSULE BY MOUTH EVERY DAY, Disp: 30 capsule, Rfl: 5    esomeprazole (NexIUM) 40 mg DR capsule, TAKE ONE CAPSULE BY MOUTH EVERY MORNING AND EVERY NIGHT AT BEDTIME, Disp: 60 capsule, Rfl: " 3    FeroSuL tablet, TAKE 1 TABLET BY MOUTH THREE TIMES DAILY WITH MEALS, Disp: 90 tablet, Rfl: 2    mirtazapine (Remeron) 7.5 mg tablet, , Disp: , Rfl:     multivitamin tablet, TAKE 1 TABLET BY MOUTH ONCE DAILY, Disp: 30 tablet, Rfl: 5    Tivicay 50 mg tablet, TAKE 1 TABLET(50 MG) BY MOUTH EVERY DAY WITH A MEAL, Disp: 30 tablet, Rfl: 5    Vemlidy 25 mg tablet tablet, TAKE 1 TABLET BY MOUTH DAILY, Disp: 30 tablet, Rfl: 5    brexpiprazole 3 mg tablet, Take 3 mg by mouth., Disp: , Rfl:     cholecalciferol (Vitamin D-3) 50,000 unit capsule, Take 1 capsule (50,000 Units) by mouth 1 (one) time per week. (Patient not taking: Reported on 8/28/2024), Disp: 4 capsule, Rfl: 5    cyanocobalamin (Vitamin B-12) 500 mcg tablet, Take by mouth., Disp: , Rfl:     desvenlafaxine (Pristiq) 50 mg 24 hr tablet, TAKE 1 TABLET BY MOUTH AT BEDTIME, Disp: 30 tablet, Rfl: 0   Physical Exam  Physical Exam  Constitutional:       General: not in acute distress.     Appearance: Normal appearance.   HENT:      Head: Normocephalic and atraumatic.      Pharynx: Oropharynx is clear. No oropharyngeal exudate.   Eyes:      General: No scleral icterus.  Cardiovascular:      Rate and Rhythm: Normal rate and regular rhythm.   Pulmonary:      Breath sounds: Normal breath sounds. No wheezing or rhonchi.   Abdominal:   Enterocutaneous fistua with solid particulate which appears to be digested food.     Palpations: Abdomen is soft.      Tenderness: There is no abdominal tenderness.   Musculoskeletal:      Cervical back: Neck supple.      Right lower leg: No edema.      Left lower leg: No edema.   Skin:     Findings: No rash.   Neurological:      Mental Status: alert.   Psychiatric:         Mood and Affect: Mood normal.     Laboratory  Lab Results   Component Value Date    BVV0OTOME 8,180 (A) 05/25/2023    BS3FOH2JMLX 0.954 08/28/2024    VITD25 17 (L) 08/28/2024      Lab Results   Component Value Date    WBC 6.4 08/28/2024    HGB 9.5 (L) 08/28/2024    HCT  "31.9 (L) 08/28/2024    MCV 89 08/28/2024     08/28/2024      Lab Results   Component Value Date    GLUCOSE 75 08/28/2024    CALCIUM 7.7 (L) 08/28/2024     08/28/2024    K 4.2 08/28/2024    CO2 24 08/28/2024     (H) 08/28/2024    BUN 9 08/28/2024    CREATININE 0.94 08/28/2024      Lab Results   Component Value Date    CHOL 117 08/28/2024    CHOL 63 09/14/2022    CHOL 115 09/18/2020     Lab Results   Component Value Date    HDL 40.4 08/28/2024    HDL 22.3 (A) 09/14/2022    HDL 51.0 09/18/2020     Lab Results   Component Value Date    LDLCALC 67 08/28/2024     Lab Results   Component Value Date    TRIG 47 08/28/2024    TRIG 73 09/25/2023    TRIG 69 09/12/2023     No components found for: \"CHOLHDL\"      Assessment/Plan   Problem List Items Addressed This Visit          Gastrointestinal and Abdominal    Enterocutaneous fistula    Current Assessment & Plan     Patient with persistent enterocutaneous fistula. Still draining food contents.   Was seen by general surgery, appears to be surgical interventional plan pending pre-operative clearance.  - Pre-operative examination as detailed below.  -Check Lipid panel  -Check Vitamin D  -Check Iron and TIBC; should iron continue to be low; will consider referral to heme-onc for Venofer given concern of malabsorption of oral iron.          Malabsorption (HHS-HCC)    Current Assessment & Plan     Patient with malabsorption likely 2/2 to hx of gastric bypass surgery complicated by enterocutaneous fistula.   - Check lipid panel, vitamin D, and Fe/TIBC         Relevant Orders    Lipid Panel (Completed)    Vitamin D 25-Hydroxy,Total (for eval of Vitamin D levels) (Completed)    Iron and TIBC (Completed)       Genitourinary and Reproductive    Renal mass    Current Assessment & Plan     Patient with 2.3 cm renal mass that appears on multiple CT scans (last scan on 08/06/23.   Patient notes symptoms including fatigue and night sweats. She has noted weight loss however " weight appears stable per chart review.  - MRI of the kidney with and without IV contrast.         Relevant Orders    MR kidney w and wo IV contrast       Health Encounters    Preoperative examination    Current Assessment & Plan     Patient presenting for pre-operative examination.   Procedure : Enterocutaneous fistula repair  Indication for Surgery: Enterocutaneous fistula   Surgeon: VERO  Planned anesthesia: TBA  Anaesthesia problems: No issues with anesthesia (has received general anesthesia without complications)  Functional Status:  5 METs (Can do housework, climb a flight of stairs)  Pre-Operative Assesment  Cardiovascular:  Blood pressure well controlled. Echo performed on 08/08/23 w/ left ventricular systolic function is normal with a 60-65% estimated ejection fraction.   Hematologic: Iron deficiency anemia. HgB between 9.3-11.1. Currently on oral iron.   Pulmonary: History of CPAP, does not use CPAP.   Endocrinology: No history of diabetes  Other: Nutrition: Evaluated by RDN. [employer] Peptide 1.5 semi-elemental formula in attempt to reduce ostomy output and improve nutrition status was ordered however insurance will not cover.   Risk for withdrawal from alcohol or other substances?: no  Risk for post operative delirium     Preoperative Plan  - Labs: CBC, CMP, Iron Studies, and Ferritin ordered.   - Procedures : EKG ordered  -Other: Patient evaluated by RDN, nutritional formula prescribed however not covered by insurance. Patient able to tolerate PO and has been encouraged to continue oral intake.     Post Operative Status: No contraindications to surgery.   Nisha Perioperative Risk for Myocardial Infarction: 1.2%             Hematology and Neoplasia    Iron deficiency anemia    Current Assessment & Plan     Patient with history of iron deficiency anemia.  Maintained on oral iron supplementation.   - Check iron panel today; should iron remain low, will consider heme-onc referral for Venofer.           Relevant Orders    Ferritin (Completed)       Infectious Diseases    HIV infection (Multi) - Primary (Chronic)    Current Assessment & Plan     Patient w/ HIV. Currently maintained on 3TC/dolutegravir/TAF for ease of swallowing. Notes she has had 2 missed doses this month however otherwise complaint.   -CD4 749 on last visit on 6/19. Checked today 954  -VL on 6/19 was 33; repeated drawn today.   -STD testing today for Gonorrhea, Chlamydia, and Syphilis   - Continue 3TC/Dolutegravir/TAF         Relevant Orders    C. Trachomatis / N. Gonorrhoeae, Amplified Detection (Completed)    CD4/8 Panel (Completed)    CBC and Auto Differential (Completed)    Comprehensive Metabolic Panel (Completed)    HIV RNA, quantitative, PCR    Syphilis Screen with Reflex (Completed)    Routine screening for STI (sexually transmitted infection)    Relevant Orders    C. Trachomatis / N. Gonorrhoeae, Amplified Detection (Completed)     Other Visit Diagnoses       Pre-operative clearance        Relevant Orders    ECG 12 lead           Health Maintenance  Sexual Health: STD testing with G/C and syphilis today  Substance Use: Denies  Dentristy: Encouraged to closely follow with dentistry  Cancer Screenings: Patient agreeable for cervical cancer screening. Patient would like to post pone colonoscopy until after Enterocutaneous fistula is fixed.     All findings, plan, and evaluation discussed with attending ID physician, Dr. Napoles.     Apolinar Monahan, DO  PGY-IV, Infectious Disease Fellow  I saw and evaluated the patient. I personally obtained the key and critical portions of the history and physical exam or was physically present for key and critical portions performed by the resident/fellow. I reviewed the resident/fellow's documentation and discussed the patient with the resident/fellow. I agree with the resident/fellow's medical decision making as documented in the note.    Her HIV is well controlled and no contraindication for  surgery    Marjorie Napoles MD

## 2024-08-28 NOTE — PROGRESS NOTES
"Nutrition Assessment     Reason for Visit:  Veena Garza is a 49 y.o. female who was seen today for follow-up.     Anthropometrics:  Height: 5'4\"  Weight: 171.6#   BMI: 29.5   Weight change: 2.2# weight loss in the past 2 months.   Weight Hx:   06/19/24: 173.8#   02/27/24: 176.5#   11/22/23: 140#      Food And Nutrient Intake:  Food and Nutrient History  Food and Nutrient History: Pt was seen today for follow-up. Weight has remained stable. She was recently seen by gen surg and plan is to repair chronic ECF pending clearance. Pt stated that her current appetite is good and she currently averages 3 meals/day. Breakfast: 3 eggs (scrambled), 2 sausage, 2 toast. Lunch/dinner: turkey sandwich or turkey burger or baked chicken or baked pork chops with baked onion rings or fries. Snacks: popcorn. Beverage of choice is water. She noted that she has one regular soda/week and limits intake 2/2 increased fistula output with intake. She explained that she has been taking MVI, and Ferosul daily. She stated that she needs vit D renewal.     Nutrition Diagnosis   Nutrition Diagnosis  Patient has Nutrition Diagnosis: Yes  Diagnosis Status (1): Ongoing  Nutrition Diagnosis 1: Altered GI function  Related to (1): EC fistula  As Evidenced by (1): food contents leaking from EC fistula; MD notes    Nutrition Interventions/Recommendations   Food and Nutrition Delivery  Meals & Snacks: Protein-modified diet, Vitamin-modified diet  Goals: Pt will continued to ensure lean protein option at main meals, thoroughly chew food contents, limit intake of regular soda if not able to avoid, and take MVI, vit D, and Ferosul as prescribed. She had no questions or concerns at this time.    Nutrition Monitoring and Evaluation   Food/Nutrient Related History Monitoring  Monitoring and Evaluation Plan: Vitamin intake, Mineral/element intake, Amount of food, Protein intake  Body Composition/Growth/Weight History  Monitoring and Evaluation Plan: " Weight  Weight: Usual stated body weight (UBW)  Biochemical Data, Medical Tests and Procedures  Monitoring and Evaluation Plan: Nutritional anemia profile, Vitamin profile, Electrolyte/renal panel    Follow-up   Progress will be reassessed in 3 months. The estimated number of remaining follow-ups at this time: 5+ at a frequency of ~Q3 months. The total number and frequency of remaining follow-ups may change depending on patient's progress. The total number and frequency of remaining follow-ups may change depending on patient's progress.     Time spent with patient: 30 minutes of which 50 percent or greater was spent counseling and or coordinating care.

## 2024-08-29 LAB
HIV1 RNA # PLAS NAA DL=20: ABNORMAL {COPIES}/ML
HIV1 RNA SPEC NAA+PROBE-LOG#: ABNORMAL {LOG_COPIES}/ML

## 2024-08-29 NOTE — ASSESSMENT & PLAN NOTE
Patient with persistent enterocutaneous fistula. Still draining food contents.   Was seen by general surgery, appears to be surgical interventional plan pending pre-operative clearance.  - Pre-operative examination as detailed below.  -Check Lipid panel  -Check Vitamin D  -Check Iron and TIBC; should iron continue to be low; will consider referral to heme-onc for Venofer given concern of malabsorption of oral iron.

## 2024-08-29 NOTE — ASSESSMENT & PLAN NOTE
Patient with 2.3 cm renal mass that appears on multiple CT scans (last scan on 08/06/23.   Patient notes symptoms including fatigue and night sweats. She has noted weight loss however weight appears stable per chart review.  - MRI of the kidney with and without IV contrast.

## 2024-08-29 NOTE — TELEPHONE ENCOUNTER
Time spend: 10 minutes    CHW met with patient at [facility].  Patient is doing well. Patient mentioned missing two pills this week.   CHW recommended placing the medication in a visible spot and setting an alarm as a reminder.   Patient had no concerns or comments at this time.   CHW will follow up as needed.

## 2024-08-29 NOTE — ASSESSMENT & PLAN NOTE
Patient presenting for pre-operative examination.   Procedure : Enterocutaneous fistula repair  Indication for Surgery: Enterocutaneous fistula   Surgeon: VERO  Planned anesthesia: TBA  Anaesthesia problems: No issues with anesthesia (has received general anesthesia without complications)  Functional Status:  5 METs (Can do housework, climb a flight of stairs)  Pre-Operative Assesment  Cardiovascular:  Blood pressure well controlled. Echo performed on 08/08/23 w/ left ventricular systolic function is normal with a 60-65% estimated ejection fraction.   Hematologic: Iron deficiency anemia. HgB between 9.3-11.1. Currently on oral iron.   Pulmonary: History of CPAP, does not use CPAP.   Endocrinology: No history of diabetes  Other: Nutrition: Evaluated by RDN. [employer] Peptide 1.5 semi-elemental formula in attempt to reduce ostomy output and improve nutrition status was ordered however insurance will not cover.   Risk for withdrawal from alcohol or other substances?:   Risk for post operative delirium     Preoperative Plan  - Labs: CBC, CMP, Iron Studies, and Ferritin ordered.   - Procedures : EKG ordered  -Other: Patient evaluated by RDN, nutritional formula prescribed however not covered by insurance. Patient able to tolerate PO and has been encouraged to continue oral intake.     Post Operative Status: No contraindications to surgery.   Nisha Perioperative Risk for Myocardial Infarction: 1.2%

## 2024-08-29 NOTE — ASSESSMENT & PLAN NOTE
Patient with history of iron deficiency anemia.  Maintained on oral iron supplementation.   - Check iron panel today; should iron remain low, will consider heme-onc referral for Venofer.

## 2024-08-29 NOTE — ASSESSMENT & PLAN NOTE
Patient with malabsorption likely 2/2 to hx of gastric bypass surgery complicated by enterocutaneous fistula.   - Check lipid panel, vitamin D, and Fe/TIBC

## 2024-08-29 NOTE — ASSESSMENT & PLAN NOTE
Patient w/ HIV. Currently maintained on 3TC/dolutegravir/TAF for ease of swallowing. Notes she has had 2 missed doses this month however otherwise complaint.   -CD4 749 on last visit on 6/19. Checked today 954  -VL on 6/19 was 33; repeated drawn today.   -STD testing today for Gonorrhea, Chlamydia, and Syphilis   - Continue 3TC/Dolutegravir/TAF

## 2024-09-04 ENCOUNTER — APPOINTMENT (OUTPATIENT)
Dept: OBSTETRICS AND GYNECOLOGY | Facility: CLINIC | Age: 49
End: 2024-09-04
Payer: COMMERCIAL

## 2024-10-01 ENCOUNTER — APPOINTMENT (OUTPATIENT)
Dept: SURGERY | Facility: CLINIC | Age: 49
End: 2024-10-01
Payer: COMMERCIAL

## 2024-10-04 DIAGNOSIS — Z21 ASYMPTOMATIC HIV INFECTION, WITH NO HISTORY OF HIV-RELATED ILLNESS (MULTI): Chronic | ICD-10-CM

## 2024-10-04 DIAGNOSIS — I10 HYPERTENSION, UNSPECIFIED TYPE: ICD-10-CM

## 2024-10-04 RX ORDER — DOLUTEGRAVIR SODIUM 50 MG/1
TABLET, FILM COATED ORAL
Qty: 30 TABLET | Refills: 5 | Status: SHIPPED | OUTPATIENT
Start: 2024-10-04

## 2024-10-04 RX ORDER — AMLODIPINE BESYLATE 10 MG/1
10 TABLET ORAL DAILY
Qty: 30 TABLET | Refills: 5 | Status: SHIPPED | OUTPATIENT
Start: 2024-10-04

## 2024-10-04 RX ORDER — EMTRICITABINE 200 MG/1
200 CAPSULE ORAL DAILY
Qty: 30 CAPSULE | Refills: 5 | Status: SHIPPED | OUTPATIENT
Start: 2024-10-04

## 2024-10-09 ENCOUNTER — APPOINTMENT (OUTPATIENT)
Dept: OBSTETRICS AND GYNECOLOGY | Facility: CLINIC | Age: 49
End: 2024-10-09
Payer: COMMERCIAL

## 2024-11-05 DIAGNOSIS — D50.9 IRON DEFICIENCY ANEMIA, UNSPECIFIED IRON DEFICIENCY ANEMIA TYPE: ICD-10-CM

## 2024-11-05 RX ORDER — FERROUS SULFATE 325(65) MG
1 TABLET ORAL
Qty: 90 TABLET | Refills: 2 | Status: SHIPPED | OUTPATIENT
Start: 2024-11-05 | End: 2024-11-06 | Stop reason: ENTERED-IN-ERROR

## 2024-11-06 RX ORDER — FERROUS SULFATE TAB 325 MG (65 MG ELEMENTAL FE) 325 (65 FE) MG
1 TAB ORAL
Qty: 90 TABLET | Refills: 2 | Status: SHIPPED | OUTPATIENT
Start: 2024-11-06

## 2024-11-24 NOTE — PROGRESS NOTES
[facility]  TRAUMA CLINIC PROGRESS NOTE    Patient Name: Veena Garza  MRN: [id number]  Admit Date:   : 1975  AGE: 49 y.o.   GENDER: female  ==============================================================================  CHIEF COMPLAINT:   Follow up from 2023- ECF fistula management      OTHER MEDICAL PROBLEMS:  HIV   Bacteremia   CKD  Asthma   GERD  JASMIN  Hep B     INCIDENTAL FINDINGS:  NA     PROCEDURES:  23 I&D washout anterior abdominal wall   3/3/23 abdominal wall wound washout, partial closure, wound vac placement        PATHOLOGY:  NA  ==============================================================================  TODAY'S ASSESSMENT AND PLAN OF CARE:  Chronic EC fistula  Discussed with Dr Guillaume, [facility] backup attending  To have fistulogram prior to next apt, scheduled for    Will have follow up appointment with [facility] clinic, when dr Guillaume is on service, to discuss future possible surgery, scheduled for apt on  at 10:30AM- ADDENDUM: AFTER DISCUSSION WITH DR GUILLAUME, PATIENTS NEEDS TO HAVE RENAL MRI COMPLETED AND RENAL MASS WORKED UP OUTPATIENT PRIOR TO CONSIDERATION FOR SURGERY. APT TO BE RESCHEDULED.  Continue optimizing nutrition, protein intake    Renal mass  Being followed by ID provider outpatient, has renal MRI ordered, not completed or worked up yet    FOLLOW UP/CALL  - has fistulogram scheduled for   Needs to have workup   - May return to work or school   - Return to clinic or ER sooner if pt. has any development of erythema, drainage, swelling, pain, fevers, or chills  - If you have questions or concerns that are not urgent, please feel free to call  [phone].  - Call [phone] to make additional appointment(s) as needed if unable to reschedule in office today    ==============================================================================  HISTORY OF PRESENT ILLNESS  Patient is a 48 yof hx of HIV, CKD, asthma, GERD, JASMIN,  HBV, gastric bypass 2016 complicated by abdominal fistula and necrotizing soft tissue infection of the abdominal wall, DVT/PE. She continues with chronic EC fistula draining with food contents.     Summary of recent clinic appts include:     Recently seen in [facility] clinic on 8/14/2024.   She was advised to follow up with PCP and make sure taking medications needed for HIV, HepC, optimize health and nutrition.    clinic appointment on 11/22/2024: patient to the clinic with reports of not taking care of her fistula, concern for gastric contents pouring out of her fistula and a G tube that she cut the end off of.  She was advised to go to the ED for evaluation.     Clinic appointment on 02/27/2024: patient presents for wound check. She is eating, drinking, voiding and having flatus, bowel movements. She has been doing her own dressing changes at home. Wound care at this time was dry abd daily changes or PRN saturation.      She saw her Infectious Disease Dr Napoles for preop exam, labs. Concern for malabsorption 2/2 hx of gastric bypass surgery complicated by EC fistula, advised protein and vitamin modified diet changes as well as preop labs.  ID provider Attempted to follow up after exam to review labs and other imaging obtained (renal mass on multiple CT scans- provider ordered MR kidney) and was unsuccessful.    At today's appt, she is doing well. States she is taking her medications, eating better and doing well overall. Fistula still with food output.  Patient is eating, drinking, voiding and having flatus, bowel movements.       MEDICAL HISTORY / ROS:  Admission history and ROS reviewed.   Patient denies:  fevers; chills; headache;  dizziness; chest pain; shortness of breath; nausea/vomiting/diarrhea/constipation; new/worsening abdominal pain or numbness/tingling/weakness of extremities.   Pertinent changes as follows:  none    PHYSICAL EXAM:  GCS 15, A+OX3, RRR, S1, S2, CTA=, no increased WOB. Abd soft, nt, nd.  MAEx4, CARLOS 5/5 x4, no extremity edema noted. 2+pp.   Fistula with food content output to mid abdomen  Otherwise patient is healthy appearing    LABS:  No results found. However, due to the size of the patient record, not all encounters were searched. Please check Results Review for a complete set of results.  MEDICATIONS:  Current Outpatient Medications   Medication Sig Dispense Refill    acetaminophen (Tylenol) 500 mg tablet Take 1 tablet (500 mg) by mouth 3 times a day as needed.      albuterol 90 mcg/actuation inhaler Inhale 1-2 puffs every 6 hours if needed.      amLODIPine (Norvasc) 10 mg tablet Take 1 tablet (10 mg) by mouth once daily. 30 tablet 5    apixaban (Eliquis) 5 mg tablet TAKE 1 TABLET BY MOUTH EVERY 12 HOURS. 60 tablet 5    ARIPiprazole (Abilify) 5 mg tablet Take by mouth once daily.      brexpiprazole 3 mg tablet Take 3 mg by mouth.      cholecalciferol (Vitamin D-3) 50,000 unit capsule Take 1 capsule (50,000 Units) by mouth 1 (one) time per week. (Patient not taking: Reported on 8/28/2024) 4 capsule 5    cyanocobalamin (Vitamin B-12) 500 mcg tablet Take by mouth.      desvenlafaxine (Pristiq) 50 mg 24 hr tablet TAKE 1 TABLET BY MOUTH AT BEDTIME 30 tablet 0    diclofenac sodium (Voltaren) 1 % gel Apply 4.5 inches (4 g) topically 2 times a day as needed (for knee pain). 240 g 3    emtricitabine (Emtriva) 200 mg capsule TAKE 1 CAPSULE BY MOUTH EVERY DAY 30 capsule 5    esomeprazole (NexIUM) 40 mg DR capsule TAKE ONE CAPSULE BY MOUTH EVERY MORNING AND EVERY NIGHT AT BEDTIME 60 capsule 3    FeroSuL tablet TAKE 1 TABLET BY MOUTH THREE TIMES DAILY WITH MEALS 90 tablet 2    mirtazapine (Remeron) 7.5 mg tablet       multivitamin tablet TAKE 1 TABLET BY MOUTH ONCE DAILY 30 tablet 5    Tivicay 50 mg tablet TAKE 1 TABLET(50 MG) BY MOUTH EVERY DAY WITH A MEAL 30 tablet 5    Vemlidy 25 mg tablet tablet TAKE 1 TABLET BY MOUTH DAILY 30 tablet 5     No current facility-administered medications for this visit.        IMAGING SUMMARY:  (summary of new imaging findings, not a copy of dictation)  none    I have reviewed all laboratory and imaging results ordered/pertinent for today's encounter.

## 2024-11-26 ENCOUNTER — TELEPHONE (OUTPATIENT)
Dept: IMMUNOLOGY | Facility: CLINIC | Age: 49
End: 2024-11-26
Payer: COMMERCIAL

## 2024-12-02 DIAGNOSIS — D50.9 IRON DEFICIENCY ANEMIA, UNSPECIFIED IRON DEFICIENCY ANEMIA TYPE: ICD-10-CM

## 2024-12-02 DIAGNOSIS — I26.99 OTHER ACUTE PULMONARY EMBOLISM WITHOUT ACUTE COR PULMONALE: ICD-10-CM

## 2024-12-02 DIAGNOSIS — E55.9 VITAMIN D DEFICIENCY: ICD-10-CM

## 2024-12-02 RX ORDER — ASPIRIN 325 MG
TABLET, DELAYED RELEASE (ENTERIC COATED) ORAL
Qty: 4 CAPSULE | Refills: 5 | Status: SHIPPED | OUTPATIENT
Start: 2024-12-02

## 2024-12-02 RX ORDER — FERROUS SULFATE 325(65) MG
1 TABLET ORAL
Qty: 90 TABLET | Refills: 2 | Status: SHIPPED | OUTPATIENT
Start: 2024-12-02

## 2024-12-02 RX ORDER — APIXABAN 5 MG/1
5 TABLET, FILM COATED ORAL EVERY 12 HOURS
Qty: 60 TABLET | Refills: 5 | Status: SHIPPED | OUTPATIENT
Start: 2024-12-02

## 2024-12-04 ENCOUNTER — APPOINTMENT (OUTPATIENT)
Dept: SURGERY | Facility: CLINIC | Age: 49
End: 2024-12-04
Payer: COMMERCIAL

## 2024-12-04 VITALS
HEART RATE: 99 BPM | HEIGHT: 64 IN | RESPIRATION RATE: 16 BRPM | SYSTOLIC BLOOD PRESSURE: 129 MMHG | WEIGHT: 183 LBS | BODY MASS INDEX: 31.24 KG/M2 | DIASTOLIC BLOOD PRESSURE: 79 MMHG

## 2024-12-04 DIAGNOSIS — L98.8 FISTULA: Primary | ICD-10-CM

## 2024-12-04 PROCEDURE — 99213 OFFICE O/P EST LOW 20 MIN: CPT | Performed by: PHYSICIAN ASSISTANT

## 2024-12-04 ASSESSMENT — PAIN SCALES - GENERAL: PAINLEVEL_OUTOF10: 2

## 2024-12-17 ENCOUNTER — APPOINTMENT (OUTPATIENT)
Dept: RADIOLOGY | Facility: HOSPITAL | Age: 49
End: 2024-12-17
Payer: COMMERCIAL

## 2024-12-18 ENCOUNTER — APPOINTMENT (OUTPATIENT)
Dept: SURGERY | Facility: CLINIC | Age: 49
End: 2024-12-18
Payer: COMMERCIAL

## 2025-01-09 DIAGNOSIS — N28.89 OTHER SPECIFIED DISORDERS OF KIDNEY AND URETER: Primary | ICD-10-CM

## 2025-01-10 ENCOUNTER — HOSPITAL ENCOUNTER (OUTPATIENT)
Dept: RADIOLOGY | Facility: HOSPITAL | Age: 50
End: 2025-01-10
Payer: COMMERCIAL

## 2025-01-10 ENCOUNTER — LAB (OUTPATIENT)
Dept: LAB | Facility: LAB | Age: 50
End: 2025-01-10
Payer: COMMERCIAL

## 2025-01-10 DIAGNOSIS — N28.89 OTHER SPECIFIED DISORDERS OF KIDNEY AND URETER: ICD-10-CM

## 2025-01-10 LAB
CREAT SERPL-MCNC: 1.31 MG/DL (ref 0.5–1.05)
EGFRCR SERPLBLD CKD-EPI 2021: 50 ML/MIN/1.73M*2

## 2025-01-10 PROCEDURE — 82565 ASSAY OF CREATININE: CPT

## 2025-01-15 ENCOUNTER — NUTRITION (OUTPATIENT)
Dept: IMMUNOLOGY | Facility: CLINIC | Age: 50
End: 2025-01-15

## 2025-01-15 ENCOUNTER — OFFICE VISIT (OUTPATIENT)
Dept: IMMUNOLOGY | Facility: CLINIC | Age: 50
End: 2025-01-15
Payer: COMMERCIAL

## 2025-01-15 VITALS
TEMPERATURE: 97.9 F | DIASTOLIC BLOOD PRESSURE: 89 MMHG | BODY MASS INDEX: 31.24 KG/M2 | RESPIRATION RATE: 18 BRPM | WEIGHT: 183 LBS | OXYGEN SATURATION: 100 % | HEART RATE: 76 BPM | HEIGHT: 64 IN | SYSTOLIC BLOOD PRESSURE: 136 MMHG

## 2025-01-15 DIAGNOSIS — D50.9 IRON DEFICIENCY ANEMIA, UNSPECIFIED IRON DEFICIENCY ANEMIA TYPE: ICD-10-CM

## 2025-01-15 DIAGNOSIS — Z21 HIV INFECTION, UNSPECIFIED SYMPTOM STATUS: Primary | ICD-10-CM

## 2025-01-15 DIAGNOSIS — K90.89 OTHER SPECIFIED INTESTINAL MALABSORPTION (HHS-HCC): ICD-10-CM

## 2025-01-15 DIAGNOSIS — N28.89 RENAL MASS: ICD-10-CM

## 2025-01-15 LAB
BASOPHILS # BLD AUTO: 0.01 X10*3/UL (ref 0–0.1)
BASOPHILS NFR BLD AUTO: 0.2 %
EOSINOPHIL # BLD AUTO: 0.04 X10*3/UL (ref 0–0.7)
EOSINOPHIL NFR BLD AUTO: 0.7 %
ERYTHROCYTE [DISTWIDTH] IN BLOOD BY AUTOMATED COUNT: 17.2 % (ref 11.5–14.5)
HCT VFR BLD AUTO: 30 % (ref 36–46)
HGB BLD-MCNC: 8.2 G/DL (ref 12–16)
IMM GRANULOCYTES # BLD AUTO: 0.02 X10*3/UL (ref 0–0.7)
IMM GRANULOCYTES NFR BLD AUTO: 0.4 % (ref 0–0.9)
LYMPHOCYTES # BLD AUTO: 1.85 X10*3/UL (ref 1.2–4.8)
LYMPHOCYTES NFR BLD AUTO: 33.9 %
MCH RBC QN AUTO: 21.5 PG (ref 26–34)
MCHC RBC AUTO-ENTMCNC: 27.3 G/DL (ref 32–36)
MCV RBC AUTO: 79 FL (ref 80–100)
MONOCYTES # BLD AUTO: 0.5 X10*3/UL (ref 0.1–1)
MONOCYTES NFR BLD AUTO: 9.2 %
NEUTROPHILS # BLD AUTO: 3.04 X10*3/UL (ref 1.2–7.7)
NEUTROPHILS NFR BLD AUTO: 55.6 %
NRBC BLD-RTO: 0 /100 WBCS (ref 0–0)
PLATELET # BLD AUTO: 249 X10*3/UL (ref 150–450)
RBC # BLD AUTO: 3.82 X10*6/UL (ref 4–5.2)
WBC # BLD AUTO: 5.5 X10*3/UL (ref 4.4–11.3)

## 2025-01-15 PROCEDURE — 3008F BODY MASS INDEX DOCD: CPT | Performed by: INTERNAL MEDICINE

## 2025-01-15 PROCEDURE — 3079F DIAST BP 80-89 MM HG: CPT | Performed by: INTERNAL MEDICINE

## 2025-01-15 PROCEDURE — 85025 COMPLETE CBC W/AUTO DIFF WBC: CPT | Performed by: INTERNAL MEDICINE

## 2025-01-15 PROCEDURE — 88185 FLOWCYTOMETRY/TC ADD-ON: CPT | Performed by: INTERNAL MEDICINE

## 2025-01-15 PROCEDURE — 99214 OFFICE O/P EST MOD 30 MIN: CPT | Performed by: INTERNAL MEDICINE

## 2025-01-15 PROCEDURE — 1036F TOBACCO NON-USER: CPT | Performed by: INTERNAL MEDICINE

## 2025-01-15 PROCEDURE — 87536 HIV-1 QUANT&REVRSE TRNSCRPJ: CPT | Performed by: INTERNAL MEDICINE

## 2025-01-15 PROCEDURE — 3075F SYST BP GE 130 - 139MM HG: CPT | Performed by: INTERNAL MEDICINE

## 2025-01-15 ASSESSMENT — PAIN SCALES - GENERAL: PAINLEVEL_OUTOF10: 0-NO PAIN

## 2025-01-15 NOTE — PROGRESS NOTES
Subjective   Veena Garza is a 49 y.o. female who presents to the [facility] for follow-up of HIV infection.     Feeling pretty good and looks stronger, mood good.  Still with EC leak. Surgeons want the MRI of the kidney before closing, Is scheduled for later this month.  Has been seeing PCP at [facility] frequently.  Had Pap last month, negative  Objective   Vitals:    01/15/25 1151   BP: 136/89   Pulse:    Resp:    Temp:    SpO2:         Current Outpatient Medications:     acetaminophen (Tylenol) 500 mg tablet, Take 1 tablet (500 mg) by mouth 3 times a day as needed., Disp: , Rfl:     albuterol 90 mcg/actuation inhaler, Inhale 1-2 puffs every 6 hours if needed., Disp: , Rfl:     ARIPiprazole (Abilify) 5 mg tablet, Take by mouth once daily., Disp: , Rfl:     brexpiprazole 3 mg tablet, Take 3 mg by mouth., Disp: , Rfl:     diclofenac sodium (Voltaren) 1 % gel, Apply 4.5 inches (4 g) topically 2 times a day as needed (for knee pain)., Disp: 240 g, Rfl: 3    Eliquis 5 mg tablet, TAKE 1 TABLET BY MOUTH EVERY 12 HOURS, Disp: 60 tablet, Rfl: 5    emtricitabine (Emtriva) 200 mg capsule, TAKE 1 CAPSULE BY MOUTH EVERY DAY, Disp: 30 capsule, Rfl: 5    esomeprazole (NexIUM) 40 mg DR capsule, TAKE ONE CAPSULE BY MOUTH EVERY MORNING AND EVERY NIGHT AT BEDTIME, Disp: 60 capsule, Rfl: 3    FeroSuL tablet, TAKE 1 TABLET BY MOUTH THREE TIMES DAILY WITH MEALS, Disp: 90 tablet, Rfl: 2    mirtazapine (Remeron) 7.5 mg tablet, , Disp: , Rfl:     Tivicay 50 mg tablet, TAKE 1 TABLET(50 MG) BY MOUTH EVERY DAY WITH A MEAL, Disp: 30 tablet, Rfl: 5    Vemlidy 25 mg tablet tablet, TAKE 1 TABLET BY MOUTH DAILY, Disp: 30 tablet, Rfl: 5    amLODIPine (Norvasc) 10 mg tablet, Take 1 tablet (10 mg) by mouth once daily. (Patient not taking: Reported on 1/15/2025), Disp: 30 tablet, Rfl: 5    cholecalciferol (Vitamin D-3) 50,000 unit capsule, TAKE ONE CAPSULE BY MOUTH ONE DAY A WEEK (Patient not taking: Reported on 1/15/2025), Disp: 4 capsule, Rfl: 5     cyanocobalamin (Vitamin B-12) 500 mcg tablet, Take by mouth. (Patient not taking: Reported on 1/15/2025), Disp: , Rfl:     desvenlafaxine (Pristiq) 50 mg 24 hr tablet, TAKE 1 TABLET BY MOUTH AT BEDTIME, Disp: 30 tablet, Rfl: 0    multivitamin tablet, TAKE 1 TABLET BY MOUTH ONCE DAILY (Patient not taking: Reported on 1/15/2025), Disp: 30 tablet, Rfl: 5   Physical Exam  Physical Exam  Constitutional:       General: not in acute distress.     Appearance: Normal appearance.   HENT:      Head: Normocephalic and atraumatic.      Pharynx: Oropharynx is clear. No oropharyngeal exudate.   Eyes:      General: No scleral icterus.  Cardiovascular:      Rate and Rhythm: Normal rate and regular rhythm.   Pulmonary:      Breath sounds: Normal breath sounds. No wheezing or rhonchi.   Abdominal:   Abdomen is soft. Ongoing enterocutanous leak with dressing collecting grey discharge     Tenderness: There is no abdominal tenderness.   Musculoskeletal:      Cervical back: Neck supple.      Right lower leg: No edema.      Left lower leg: No edema.   Skin:     Findings: No rash.   Neurological:      Mental Status: alert.   Psychiatric:         Mood and Affect: Mood normal.     Laboratory  Lab Results   Component Value Date    VFG7ADVBL 8,180 (A) 05/25/2023    HX3NFJ1MXRP 0.954 08/28/2024    VITD25 17 (L) 08/28/2024      Lab Results   Component Value Date    WBC 6.4 08/28/2024    HGB 9.5 (L) 08/28/2024    HCT 31.9 (L) 08/28/2024    MCV 89 08/28/2024     08/28/2024      Lab Results   Component Value Date    GLUCOSE 75 08/28/2024    CALCIUM 7.7 (L) 08/28/2024     08/28/2024    K 4.2 08/28/2024    CO2 24 08/28/2024     (H) 08/28/2024    BUN 9 08/28/2024    CREATININE 1.31 (H) 01/10/2025      Lab Results   Component Value Date    CHOL 117 08/28/2024    CHOL 63 09/14/2022    CHOL 115 09/18/2020     Lab Results   Component Value Date    HDL 40.4 08/28/2024    HDL 22.3 (A) 09/14/2022    HDL 51.0 09/18/2020     Lab Results  "  Component Value Date    LDLCALC 67 08/28/2024     Lab Results   Component Value Date    TRIG 47 08/28/2024    TRIG 73 09/25/2023    TRIG 69 09/12/2023     No components found for: \"CHOLHDL\"      Assessment/Plan   Problem List Items Addressed This Visit       HIV infection - Primary (Chronic)    Current Assessment & Plan     Was UD with with CD4 954 8/24. Says doing well swallowing her 3 small pills         Relevant Orders    CD4/8 Panel    CBC and Auto Differential    HIV RNA, quantitative, PCR    Iron deficiency anemia    Current Assessment & Plan     Will increase her iron to twice a day         Renal mass    Current Assessment & Plan     Found on CT in house, MRI ordered in August but she says unaware, she is going in 2 weeks         Malabsorption ([facility])      Health Maintenance  Refuses flu shot  Pap last month negative with negative HPV testing  Marjorie Napoles MD   " 4 = No assist / stand by assistance

## 2025-01-16 LAB
CD3+CD4+ CELLS # BLD: 0.67 X10E9/L
CD3+CD4+ CELLS # BLD: 666 /MM3
CD3+CD4+ CELLS NFR BLD: 36 %
CD3+CD4+ CELLS/CD3+CD8+ CLL BLD: 1 %
CD3+CD8+ CELLS # BLD: 0.67 X10E9/L
CD3+CD8+ CELLS NFR BLD: 36 %
HIV1 RNA # PLAS NAA DL=20: 1400 COPIES/ML
HIV1 RNA # PLAS NAA DL=20: DETECTED {COPIES}/ML
HIV1 RNA SPEC NAA+PROBE-LOG#: 3.15 LOG10 CPY/ML
LYMPHOCYTES # SPEC AUTO: 1.85 X10*3/UL

## 2025-01-16 NOTE — PROGRESS NOTES
"Nutrition Assessment     Reason for Visit:  Veena Garza is a 49 y.o. female who was seen today for follow-up.     Anthropometrics:  Height: 5'4\"  Weight: 183#   BMI: 31.4   Weight change: 11.4# weight gain in the past 5 months.   Weight Hx:   12/4/24: 183#   08/28/24: 171.6#   06/19/24: 173.8#     Food And Nutrient Intake:  Food and Nutrient History  Food and Nutrient History: Pt was seen today for follow-up. She continues with EC fistula. Per surgery, she must have kidney mass w/u before fistula repair. She has gained 11.4# in the past 5 months (171.6# 8/28/24). Her appetite continues to improve. She currently averages 3 meals/day. Breakfast: 3 scrambled eggs + 2 sausage + 2 toast. Lunch/dinner: turkey sandwich or turkey burger or baked chicken or baked pork chops with baked onion rings or fries. Snacks: popcorn. Bevs: water, soda (stated that she may drink soda throughout the day). She ran out of MVI and has not refilled Vit D. She has been taking Ferosul daily.     Nutrition Diagnosis   Nutrition Diagnosis  Patient has Nutrition Diagnosis: Yes  Diagnosis Status (1): Ongoing  Nutrition Diagnosis 1: Altered GI function  Related to (1): EC fistula  As Evidenced by (1): food contents leaking from EC fistula; MD notes    Nutrition Interventions/Recommendations   Food and Nutrition Delivery  Meals & Snacks: Vitamin-modified diet, Protein-modified diet  Goals: Pt was encouraged to continue prioritizing excellent sources of protein at main meals. She was then encouraged to limit if not avoid intake of regular soda. Lastly, the importance of taking MVI, vit D, and iron daily was reiterated. She stated that she will buy MVI to resume. She will also reach out to pharmacy and ask whether the vit D rx can be sent to her house with her other meds. She had no questions or concerns at this time.    Nutrition Monitoring and Evaluation   Food/Nutrient Related History Monitoring  Monitoring and Evaluation Plan: Vitamin intake, " Mineral/element intake, Amount of food, Protein intake  Body Composition/Growth/Weight History  Monitoring and Evaluation Plan: Weight  Weight: Usual stated body weight (UBW)  Biochemical Data, Medical Tests and Procedures  Monitoring and Evaluation Plan: Nutritional anemia profile, Vitamin profile, Electrolyte/renal panel    Follow-up   Progress will be reassessed at next office visit. The estimated number of remaining follow-ups at this time: 4+ at a frequency of ~Q4 months. The total number and frequency of remaining follow-ups may change depending on patient's progress.     Time spent with patient: 60 minutes of which 50 percent or greater was spent counseling and or coordinating care.

## 2025-01-29 ENCOUNTER — APPOINTMENT (OUTPATIENT)
Dept: RADIOLOGY | Facility: HOSPITAL | Age: 50
End: 2025-01-29
Payer: COMMERCIAL

## 2025-02-24 ENCOUNTER — HOSPITAL ENCOUNTER (OUTPATIENT)
Dept: RADIOLOGY | Facility: HOSPITAL | Age: 50
Discharge: HOME | End: 2025-02-24
Payer: COMMERCIAL

## 2025-02-24 DIAGNOSIS — N28.89 RENAL MASS: ICD-10-CM

## 2025-02-24 PROCEDURE — 74181 MRI ABDOMEN W/O CONTRAST: CPT

## 2025-02-24 PROCEDURE — 74181 MRI ABDOMEN W/O CONTRAST: CPT | Performed by: RADIOLOGY

## 2025-02-27 DIAGNOSIS — Z21 HIV INFECTION, UNSPECIFIED SYMPTOM STATUS: Chronic | ICD-10-CM

## 2025-03-05 ENCOUNTER — CLINICAL SUPPORT (OUTPATIENT)
Dept: IMMUNOLOGY | Facility: CLINIC | Age: 50
End: 2025-03-05
Payer: COMMERCIAL

## 2025-03-05 DIAGNOSIS — Z21 HIV INFECTION, UNSPECIFIED SYMPTOM STATUS: Chronic | ICD-10-CM

## 2025-03-05 PROCEDURE — 87536 HIV-1 QUANT&REVRSE TRNSCRPJ: CPT

## 2025-03-05 PROCEDURE — 87900 PHENOTYPE INFECT AGENT DRUG: CPT

## 2025-03-06 LAB
HIV1 RNA # PLAS NAA DL=20: 63 COPIES/ML
HIV1 RNA # PLAS NAA DL=20: DETECTED {COPIES}/ML
HIV1 RNA SPEC NAA+PROBE-LOG#: 1.8 LOG10 CPY/ML

## 2025-03-21 DIAGNOSIS — B20 HUMAN IMMUNODEFICIENCY VIRUS (MULTI): ICD-10-CM

## 2025-03-21 DIAGNOSIS — I10 HYPERTENSION, UNSPECIFIED TYPE: ICD-10-CM

## 2025-03-24 RX ORDER — TENOFOVIR ALAFENAMIDE 25 MG/1
25 TABLET ORAL DAILY
Qty: 30 TABLET | Refills: 5 | Status: SHIPPED | OUTPATIENT
Start: 2025-03-24

## 2025-03-24 RX ORDER — AMLODIPINE BESYLATE 10 MG/1
10 TABLET ORAL DAILY
Qty: 90 TABLET | Refills: 1 | Status: SHIPPED | OUTPATIENT
Start: 2025-03-24

## 2025-03-25 LAB
BICTEGRAVIR RESISTANCE, HIVGN: NORMAL
CABOTEGRAVIR RESISTANCE, HIVGN: NORMAL
DATE VIRAL LOAD COLLECTED, HIVGN: NORMAL
DOLUTEGRAVIR RESISTANCE, HIVGN: NORMAL
ELVITEGRAVIR RESISTANCE, HIVGN: NORMAL
HIV  GENOTYPE: NORMAL
HIV 1 GENOTYPE: NOT DETECTED
HIV-1 INTEGRASE GENOTYPE: NORMAL
RALTEGRAVIR RESISTANCE, HIVGN: NORMAL
VALUE OF LAST VIRAL LOAD, HIVGN: NORMAL COPIES/ML

## 2025-04-28 DIAGNOSIS — D50.9 IRON DEFICIENCY ANEMIA, UNSPECIFIED IRON DEFICIENCY ANEMIA TYPE: ICD-10-CM

## 2025-04-28 RX ORDER — FERROUS SULFATE 325(65) MG
1 TABLET ORAL
Qty: 90 TABLET | Refills: 5 | Status: SHIPPED | OUTPATIENT
Start: 2025-04-28

## 2025-05-19 ENCOUNTER — TELEPHONE (OUTPATIENT)
Dept: IMMUNOLOGY | Facility: CLINIC | Age: 50
End: 2025-05-19
Payer: COMMERCIAL

## 2025-05-19 NOTE — PROGRESS NOTES
[facility]  TRAUMA CLINIC PROGRESS NOTE    Patient Name: Veena Garza  MRN: [id number]  Admit Date:   : 1975  AGE: 49 y.o.   GENDER: female  ==============================================================================  CHIEF COMPLAINT:   Follow up from 2023- ECF fistula management      OTHER MEDICAL PROBLEMS:  HIV   Bacteremia   CKD  Asthma   GERD  JASMIN  Hep B     INCIDENTAL FINDINGS:  NA     PROCEDURES:  23 I&D washout anterior abdominal wall   3/3/23 abdominal wall wound washout, partial closure, wound vac placement     PATHOLOGY:  NA  ==============================================================================  TODAY'S ASSESSMENT AND PLAN OF CARE:  ECF  Fistula was again covered with a dish towel at today's appointment  Please continue to use wound manager bags for accurate outputs    Fistula takedown future surgery discussion  If she would like to discuss surgery in the future, she will need a fistulogram  There will also need to be accountability on the patients end for adequate surgical optimization such as measuring output of fistula, expectations for surgery and recovery, wound care, etc.     Discussed with Dr Markell obrien present for [facility] clinic appointment      FOLLOW UP/CALL  - No trauma/ACS follow up scheduled at this time  If patient would like to schedule a future appointment at any time, she is provided our clinic phone number  - Return to clinic or ER sooner if pt. has any development of erythema, drainage, swelling, pain, fevers, or chills  - If you have questions or concerns that are not urgent, please feel free to call  [phone].  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 She continues with chronic EC fistula draining with food contents.      Summary of recent clinic appts include:      Recently seen in [facility] clinic on 8/14/2024.   She was advised to follow up with PCP and make sure taking medications needed for HIV, HepC, optimize health and nutrition.     clinic appointment on 11/22/2024: patient to the clinic with reports of not taking care of her fistula, concern for gastric contents pouring out of her fistula and a G tube that she cut the end off of.  She was advised to go to the ED for evaluation.     Clinic appointment on 02/27/2024: patient presents for wound check. She is eating, drinking, voiding and having flatus, bowel movements. She has been doing her own dressing changes at home. Wound care at this time was dry abd daily changes or PRN saturation.        She saw her Infectious Disease Dr Napoles for preop exam, labs. Concern for malabsorption 2/2 hx of gastric bypass surgery complicated by EC fistula, advised protein and vitamin modified diet changes as well as preop labs.  ID provider Attempted to follow up after exam to review labs and other imaging obtained (renal mass on multiple CT scans- provider ordered MR kidney) and was unsuccessful.     At her appointment on 12/04/2025, she is doing well. States she is taking her medications, eating better and doing well overall. Fistula still with food output. Discussed with Dr Guillaume, ACS backup attending- was discussed for patient to have renal mass worked up outpatient prior to consideration for surgery.     She is unaware of how much output from fistula, she is not measuring output  Patient is eating, drinking, voiding and having flatus, bowel movements.     Dr. Guillaume at bedside for reversal discussion.    MEDICAL HISTORY / ROS:  Admission history and ROS reviewed.   Patient denies:  fevers; chills; headache;   dizziness; chest pain; shortness of breath; nausea/vomiting/diarrhea/constipation; new/worsening abdominal pain or numbness/tingling/weakness of extremities.   Pertinent changes as follows:  NA    PHYSICAL EXAM:  GCS 15, A+OX3, RRR, S1, S2, CTA=, no increased WOB. Abd soft, nt, nd. MAEx4, CARLOS 5/5 x4, no extremity edema noted. 2+pp. Wound is midline abdomen with excoriated skin due to dish towel covering ECF. Prolene sutures noted sticking out of fistula.       LABS:  No results found. However, due to the size of the patient record, not all encounters were searched. Please check Results Review for a complete set of results.  MEDICATIONS:  Current Medications[1]    IMAGING SUMMARY:  (summary of new imaging findings, not a copy of dictation)  NA    I have reviewed all laboratory and imaging results ordered/pertinent for today's encounter.          [1]   Current Outpatient Medications   Medication Sig Dispense Refill    acetaminophen (Tylenol) 500 mg tablet Take 1 tablet (500 mg) by mouth 3 times a day as needed.      albuterol 90 mcg/actuation inhaler Inhale 1-2 puffs every 6 hours if needed.      amLODIPine (Norvasc) 10 mg tablet TAKE 1 TABLET(10 MG) BY MOUTH DAILY 90 tablet 1    ARIPiprazole (Abilify) 5 mg tablet Take by mouth once daily.      brexpiprazole 3 mg tablet Take 3 mg by mouth.      cholecalciferol (Vitamin D-3) 50,000 unit capsule TAKE ONE CAPSULE BY MOUTH ONE DAY A WEEK (Patient not taking: Reported on 1/15/2025) 4 capsule 5    cyanocobalamin (Vitamin B-12) 500 mcg tablet Take by mouth. (Patient not taking: Reported on 1/15/2025)      desvenlafaxine (Pristiq) 50 mg 24 hr tablet TAKE 1 TABLET BY MOUTH AT BEDTIME 30 tablet 0    diclofenac sodium (Voltaren) 1 % gel Apply 4.5 inches (4 g) topically 2 times a day as needed (for knee pain). 240 g 3    Eliquis 5 mg tablet TAKE 1 TABLET BY MOUTH EVERY 12 HOURS 60 tablet 5    emtricitabine (Emtriva) 200 mg capsule TAKE 1 CAPSULE BY MOUTH EVERY DAY 30 capsule 5     esomeprazole (NexIUM) 40 mg DR capsule TAKE ONE CAPSULE BY MOUTH EVERY MORNING AND EVERY NIGHT AT BEDTIME 60 capsule 3    ferrous sulfate (FeroSuL) 325 mg (65 mg elemental) tablet TAKE 1 TABLET BY MOUTH THREE TIMES DAILY WITH MEALS 90 tablet 5    mirtazapine (Remeron) 7.5 mg tablet       multivitamin tablet TAKE 1 TABLET BY MOUTH ONCE DAILY (Patient not taking: Reported on 1/15/2025) 30 tablet 5    Tivicay 50 mg tablet TAKE 1 TABLET(50 MG) BY MOUTH EVERY DAY WITH A MEAL 30 tablet 5    Vemlidy 25 mg tablet tablet TAKE 1 TABLET BY MOUTH DAILY 30 tablet 5     No current facility-administered medications for this visit.

## 2025-05-19 NOTE — TELEPHONE ENCOUNTER
CHW called to check in as well as to remind pt about upcoming appointment. CHW was not able to reach pt at this time. CHW LVM.

## 2025-05-20 ENCOUNTER — OFFICE VISIT (OUTPATIENT)
Dept: IMMUNOLOGY | Facility: CLINIC | Age: 50
End: 2025-05-20
Payer: COMMERCIAL

## 2025-05-20 ENCOUNTER — NUTRITION (OUTPATIENT)
Dept: IMMUNOLOGY | Facility: CLINIC | Age: 50
End: 2025-05-20

## 2025-05-20 VITALS
RESPIRATION RATE: 21 BRPM | SYSTOLIC BLOOD PRESSURE: 146 MMHG | DIASTOLIC BLOOD PRESSURE: 84 MMHG | BODY MASS INDEX: 33.33 KG/M2 | WEIGHT: 194.2 LBS | HEART RATE: 79 BPM | OXYGEN SATURATION: 97 %

## 2025-05-20 DIAGNOSIS — K90.89 OTHER SPECIFIED INTESTINAL MALABSORPTION (HHS-HCC): ICD-10-CM

## 2025-05-20 DIAGNOSIS — B20 CURRENTLY ASYMPTOMATIC HIV INFECTION, WITH HISTORY OF HIV-RELATED ILLNESS (MULTI): Primary | ICD-10-CM

## 2025-05-20 DIAGNOSIS — E55.9 VITAMIN D DEFICIENCY: ICD-10-CM

## 2025-05-20 DIAGNOSIS — I10 HYPERTENSION, UNSPECIFIED TYPE: ICD-10-CM

## 2025-05-20 LAB
25(OH)D3 SERPL-MCNC: 16 NG/ML (ref 30–100)
ALBUMIN SERPL BCP-MCNC: 3.2 G/DL (ref 3.4–5)
ALP SERPL-CCNC: 180 U/L (ref 33–110)
ALT SERPL W P-5'-P-CCNC: 9 U/L (ref 7–45)
ANION GAP SERPL CALC-SCNC: 9 MMOL/L (ref 10–20)
AST SERPL W P-5'-P-CCNC: 14 U/L (ref 9–39)
BASOPHILS # BLD AUTO: 0.01 X10*3/UL (ref 0–0.1)
BASOPHILS NFR BLD AUTO: 0.2 %
BILIRUB SERPL-MCNC: 0.2 MG/DL (ref 0–1.2)
BUN SERPL-MCNC: 11 MG/DL (ref 6–23)
CALCIUM SERPL-MCNC: 8.4 MG/DL (ref 8.6–10.6)
CD3+CD4+ CELLS # BLD: 0.61 X10E9/L (ref 0.35–2.74)
CD3+CD4+ CELLS # BLD: 607 /MM3 (ref 350–2740)
CD3+CD4+ CELLS NFR BLD: 41 % (ref 29–57)
CD3+CD4+ CELLS/CD3+CD8+ CLL BLD: 1.41 % (ref 1–3.5)
CD3+CD8+ CELLS # BLD: 0.43 X10E9/L (ref 0.08–1.49)
CD3+CD8+ CELLS NFR BLD: 29 % (ref 7–31)
CHLORIDE SERPL-SCNC: 111 MMOL/L (ref 98–107)
CHOLEST SERPL-MCNC: 131 MG/DL (ref 0–199)
CHOLESTEROL/HDL RATIO: 2.7
CO2 SERPL-SCNC: 25 MMOL/L (ref 21–32)
CREAT SERPL-MCNC: 1.22 MG/DL (ref 0.5–1.05)
EGFRCR SERPLBLD CKD-EPI 2021: 55 ML/MIN/1.73M*2
EOSINOPHIL # BLD AUTO: 0.08 X10*3/UL (ref 0–0.7)
EOSINOPHIL NFR BLD AUTO: 1.7 %
ERYTHROCYTE [DISTWIDTH] IN BLOOD BY AUTOMATED COUNT: 18.4 % (ref 11.5–14.5)
FERRITIN SERPL-MCNC: 17 NG/ML (ref 8–150)
GLUCOSE SERPL-MCNC: 100 MG/DL (ref 74–99)
HCT VFR BLD AUTO: 27.9 % (ref 36–46)
HDLC SERPL-MCNC: 49.3 MG/DL
HGB BLD-MCNC: 7.5 G/DL (ref 12–16)
IMM GRANULOCYTES # BLD AUTO: 0.01 X10*3/UL (ref 0–0.7)
IMM GRANULOCYTES NFR BLD AUTO: 0.2 % (ref 0–0.9)
LDLC SERPL CALC-MCNC: 72 MG/DL
LYMPHOCYTES # BLD AUTO: 1.48 X10*3/UL (ref 1.2–4.8)
LYMPHOCYTES # SPEC AUTO: 1.48 X10*3/UL
LYMPHOCYTES NFR BLD AUTO: 31.3 %
MCH RBC QN AUTO: 20.7 PG (ref 26–34)
MCHC RBC AUTO-ENTMCNC: 26.9 G/DL (ref 32–36)
MCV RBC AUTO: 77 FL (ref 80–100)
MONOCYTES # BLD AUTO: 0.51 X10*3/UL (ref 0.1–1)
MONOCYTES NFR BLD AUTO: 10.8 %
NEUTROPHILS # BLD AUTO: 2.64 X10*3/UL (ref 1.2–7.7)
NEUTROPHILS NFR BLD AUTO: 55.8 %
NON HDL CHOLESTEROL: 82 MG/DL (ref 0–149)
NRBC BLD-RTO: 0 /100 WBCS (ref 0–0)
PLATELET # BLD AUTO: 274 X10*3/UL (ref 150–450)
POTASSIUM SERPL-SCNC: 4.1 MMOL/L (ref 3.5–5.3)
PROT SERPL-MCNC: 7.4 G/DL (ref 6.4–8.2)
RBC # BLD AUTO: 3.62 X10*6/UL (ref 4–5.2)
SODIUM SERPL-SCNC: 141 MMOL/L (ref 136–145)
TRIGL SERPL-MCNC: 51 MG/DL (ref 0–149)
VLDL: 10 MG/DL (ref 0–40)
WBC # BLD AUTO: 4.7 X10*3/UL (ref 4.4–11.3)

## 2025-05-20 PROCEDURE — 99214 OFFICE O/P EST MOD 30 MIN: CPT | Performed by: INTERNAL MEDICINE

## 2025-05-20 PROCEDURE — 88185 FLOWCYTOMETRY/TC ADD-ON: CPT | Performed by: INTERNAL MEDICINE

## 2025-05-20 PROCEDURE — 3077F SYST BP >= 140 MM HG: CPT | Performed by: INTERNAL MEDICINE

## 2025-05-20 PROCEDURE — G2211 COMPLEX E/M VISIT ADD ON: HCPCS | Performed by: INTERNAL MEDICINE

## 2025-05-20 PROCEDURE — 80053 COMPREHEN METABOLIC PANEL: CPT | Performed by: INTERNAL MEDICINE

## 2025-05-20 PROCEDURE — 1036F TOBACCO NON-USER: CPT | Performed by: INTERNAL MEDICINE

## 2025-05-20 PROCEDURE — 85025 COMPLETE CBC W/AUTO DIFF WBC: CPT | Performed by: INTERNAL MEDICINE

## 2025-05-20 PROCEDURE — 80061 LIPID PANEL: CPT | Performed by: INTERNAL MEDICINE

## 2025-05-20 PROCEDURE — 82306 VITAMIN D 25 HYDROXY: CPT | Performed by: INTERNAL MEDICINE

## 2025-05-20 PROCEDURE — 3079F DIAST BP 80-89 MM HG: CPT | Performed by: INTERNAL MEDICINE

## 2025-05-20 PROCEDURE — 82728 ASSAY OF FERRITIN: CPT | Performed by: INTERNAL MEDICINE

## 2025-05-20 PROCEDURE — 87536 HIV-1 QUANT&REVRSE TRNSCRPJ: CPT | Performed by: INTERNAL MEDICINE

## 2025-05-20 RX ORDER — BISMUTH SUBSALICYLATE 262 MG
1 TABLET,CHEWABLE ORAL DAILY
Qty: 30 TABLET | Refills: 5 | Status: SHIPPED | OUTPATIENT
Start: 2025-05-20

## 2025-05-20 RX ORDER — VIT C/E/ZN/COPPR/LUTEIN/ZEAXAN 250MG-90MG
500 CAPSULE ORAL DAILY
Qty: 30 TABLET | Refills: 5 | Status: SHIPPED | OUTPATIENT
Start: 2025-05-20

## 2025-05-20 RX ORDER — ASPIRIN 325 MG
1.25 TABLET, DELAYED RELEASE (ENTERIC COATED) ORAL WEEKLY
Qty: 4 CAPSULE | Refills: 5 | Status: SHIPPED | OUTPATIENT
Start: 2025-05-20

## 2025-05-20 ASSESSMENT — PAIN SCALES - GENERAL: PAINLEVEL_OUTOF10: 8

## 2025-05-20 NOTE — PROGRESS NOTES
Subjective   Veena Garza is a 49 y.o. female who presents to the [facility] for follow-up of HIV infection.   Doing ok she says. Taking her meds  Still with very active EC fistula-her scrambled eggs were in her dressing  VL was down after taking her meds, unable to do genotype  Has not done her scan for the kidney-says contrast makes her itch. I did explain that angie for MRI different than CT as I see she had refused it. Has not done scans her PCP ordered . Sees surgery tomorrow regarding the fistula.   Despite this she has gained weight so must be absorbing some of her food. Not taking vitamins as pharmacy did not send; reordered but may be that her insurance does not cover.   Objective   Vitals:    05/20/25 1055   BP: 146/84   Pulse: 79   Resp: 21   SpO2: 97%      Current Medications[1]   Physical Exam  Physical Exam  Constitutional:       General: not in acute distress.     Appearance: Normal appearance.   HENT:      Head: Normocephalic and atraumatic.      Pharynx: Oropharynx is clear. No oropharyngeal exudate.   Eyes:      General: No scleral icterus.  Cardiovascular:      Rate and Rhythm: Normal rate and regular rhythm.   Pulmonary:      Breath sounds: Normal breath sounds. No wheezing or rhonchi.   Abdominal:      Palpations: Abdomen is soft. Near umbilicus is a fistulas with copiuos curds of scramble eggs coming out onto the dressing     Tenderness: There is no abdominal tenderness.   Musculoskeletal:      Cervical back: Neck supple.      Right lower leg: No edema.      Left lower leg: No edema.   Skin:     Findings: No rash.   Neurological:      Mental Status: alert.   Psychiatric:         Mood and Affect: Mood normal.     Laboratory  Lab Results   Component Value Date    LWV1WKHIM 8,180 (A) 05/25/2023    [id number] 0.666 01/15/2025    VITD25 16 (L) 05/20/2025      Lab Results   Component Value Date    WBC 4.7 05/20/2025    HGB 7.5 (L) 05/20/2025    HCT 27.9 (L) 05/20/2025    MCV 77 (L) 05/20/2025      "05/20/2025      Lab Results   Component Value Date    GLUCOSE 100 (H) 05/20/2025    CALCIUM 8.4 (L) 05/20/2025     05/20/2025    K 4.1 05/20/2025    CO2 25 05/20/2025     (H) 05/20/2025    BUN 11 05/20/2025    CREATININE 1.22 (H) 05/20/2025      Lab Results   Component Value Date    CHOL 131 05/20/2025    CHOL 117 08/28/2024    CHOL 63 09/14/2022     Lab Results   Component Value Date    HDL 49.3 05/20/2025    HDL 40.4 08/28/2024    HDL 22.3 (A) 09/14/2022     Lab Results   Component Value Date    LDLCALC 72 05/20/2025    LDLCALC 67 08/28/2024     Lab Results   Component Value Date    TRIG 51 05/20/2025    TRIG 47 08/28/2024    TRIG 73 09/25/2023     No components found for: \"CHOLHDL\"      Assessment/Plan   Problem List Items Addressed This Visit       HIV infection - Primary (Chronic)    Relevant Orders    CD4/8 Panel    CBC and Auto Differential (Completed)    Comprehensive Metabolic Panel (Completed)    HIV RNA, quantitative, PCR    Hypertension    Current Assessment & Plan   On amlodipine, not goal today         Vitamin D deficiency    Current Assessment & Plan   Check level         Relevant Medications    cholecalciferol (Vitamin D-3) 1.25 mg (50,000 units) capsule    multivitamin tablet    Other Relevant Orders    Vitamin D 25-Hydroxy,Total (for eval of Vitamin D levels) (Completed)    Malabsorption ([facility])    Current Assessment & Plan   Will check iron, vit D , B12         Relevant Medications    cholecalciferol (Vitamin D-3) 1.25 mg (50,000 units) capsule    multivitamin tablet    cyanocobalamin (Vitamin B-12) 500 mcg tablet    Other Relevant Orders    Ferritin (Completed)    Lipid Panel (Completed)      Health Maintenance  Sees PCP at the [facility]  Marjorie Napoles MD        [1]   Current Outpatient Medications:     acetaminophen (Tylenol) 500 mg tablet, Take 1 tablet (500 mg) by mouth 3 times a day as needed., Disp: , Rfl:     albuterol 90 mcg/actuation inhaler, Inhale 1-2 puffs every 6 " hours if needed., Disp: , Rfl:     amLODIPine (Norvasc) 10 mg tablet, TAKE 1 TABLET(10 MG) BY MOUTH DAILY, Disp: 90 tablet, Rfl: 1    ARIPiprazole (Abilify) 5 mg tablet, Take by mouth once daily., Disp: , Rfl:     brexpiprazole 3 mg tablet, Take 3 mg by mouth., Disp: , Rfl:     desvenlafaxine (Pristiq) 50 mg 24 hr tablet, TAKE 1 TABLET BY MOUTH AT BEDTIME, Disp: 30 tablet, Rfl: 0    diclofenac sodium (Voltaren) 1 % gel, Apply 4.5 inches (4 g) topically 2 times a day as needed (for knee pain)., Disp: 240 g, Rfl: 3    Eliquis 5 mg tablet, TAKE 1 TABLET BY MOUTH EVERY 12 HOURS, Disp: 60 tablet, Rfl: 5    emtricitabine (Emtriva) 200 mg capsule, TAKE 1 CAPSULE BY MOUTH EVERY DAY, Disp: 30 capsule, Rfl: 5    esomeprazole (NexIUM) 40 mg DR capsule, TAKE ONE CAPSULE BY MOUTH EVERY MORNING AND EVERY NIGHT AT BEDTIME, Disp: 60 capsule, Rfl: 3    ferrous sulfate (FeroSuL) 325 mg (65 mg elemental) tablet, TAKE 1 TABLET BY MOUTH THREE TIMES DAILY WITH MEALS, Disp: 90 tablet, Rfl: 5    mirtazapine (Remeron) 7.5 mg tablet, , Disp: , Rfl:     Tivicay 50 mg tablet, TAKE 1 TABLET(50 MG) BY MOUTH EVERY DAY WITH A MEAL, Disp: 30 tablet, Rfl: 5    Vemlidy 25 mg tablet tablet, TAKE 1 TABLET BY MOUTH DAILY, Disp: 30 tablet, Rfl: 5    cholecalciferol (Vitamin D-3) 1.25 mg (50,000 units) capsule, Take 1 capsule (1.25 mg) by mouth 1 (one) time per week., Disp: 4 capsule, Rfl: 5    cyanocobalamin (Vitamin B-12) 500 mcg tablet, Take 1 tablet (500 mcg) by mouth once daily., Disp: 30 tablet, Rfl: 5    multivitamin tablet, TAKE 1 TABLET BY MOUTH ONCE DAILY, Disp: 30 tablet, Rfl: 5

## 2025-05-21 ENCOUNTER — APPOINTMENT (OUTPATIENT)
Dept: SURGERY | Facility: CLINIC | Age: 50
End: 2025-05-21
Payer: COMMERCIAL

## 2025-05-21 VITALS
WEIGHT: 194 LBS | SYSTOLIC BLOOD PRESSURE: 162 MMHG | HEART RATE: 86 BPM | BODY MASS INDEX: 33.3 KG/M2 | DIASTOLIC BLOOD PRESSURE: 107 MMHG

## 2025-05-21 DIAGNOSIS — Z51.89 VISIT FOR WOUND CHECK: Primary | ICD-10-CM

## 2025-05-21 LAB
HIV1 RNA # PLAS NAA DL=20: 1860 COPIES/ML (ref ?–20)
HIV1 RNA # PLAS NAA DL=20: DETECTED {COPIES}/ML
HIV1 RNA SPEC NAA+PROBE-LOG#: 3.27 LOG10 CPY/ML

## 2025-05-21 PROCEDURE — 99213 OFFICE O/P EST LOW 20 MIN: CPT | Performed by: PHYSICIAN ASSISTANT

## 2025-05-21 ASSESSMENT — PAIN SCALES - GENERAL: PAINLEVEL_OUTOF10: 4

## 2025-05-22 ENCOUNTER — TELEPHONE (OUTPATIENT)
Dept: SURGERY | Facility: CLINIC | Age: 50
End: 2025-05-22
Payer: COMMERCIAL

## 2025-05-22 NOTE — TELEPHONE ENCOUNTER
This RN and Gloria Higginbotham PA-C were able to speak with Ms. Garza. She is aware that wound care will need a face-to-face appointment to fit her properly with a bag, she is awaiting wound cares call. She has our clinic number and will call if interested in any further operations.     Elizabeth Chinchilla RN, MSN

## 2025-05-22 NOTE — TELEPHONE ENCOUNTER
This RN was returning a call and was unable to reach Ms. Garza. Spoke with Emily King with wound care and she stated that Ms. Greg wound need to be seen in clinic to be fitted for a bag. However was unable to reach Ms. Garza to schedule an appointment. Left a voicemail with clinic return phone number for patient to call to get scheduled. Awaiting return call.     Elizabeth Chinchilla RN, MSN

## 2025-05-28 NOTE — PROGRESS NOTES
"Nutrition Assessment     Reason for Visit:  Veena Garza is a 49 y.o. female who was seen today for follow-up.     Anthropometrics:  Height: 5'4\"  Weight: 194#   BMI: 33.3   Weight change: 9.2 weight gain in the past 3 months.  Weight Hx:   02/24/25: 185#   01/15/25: 183#   12/04/24: 183#      Food And Nutrient Intake:  Food and Nutrient History  Food and Nutrient History: Pt was seen today for follow-up. She has gained 9.2# in the past 3 months. She reported a good appetite and had an egg sandwich this AM. She continues to average 2-3 meals/day. Breakfast may be an egg sandwich or eggs with toast. Lunch/dinner: turkey sandwich or bologna and cheese sandwich or turkey burger or baked chicken with baked onion rings or fries. Snack of choice is popcorn. Primary beverage is water. She may have soda on occasion. She has not been taking MVI or Iron.     Nutrition Diagnosis   Nutrition Diagnosis  Patient has Nutrition Diagnosis: Yes  Diagnosis Status (1): Active  Nutrition Diagnosis 1: Altered GI function  Related to (1): EC fistula  As Evidenced by (1): food contents leaking from EC fistula; MD notes    Nutrition Interventions/Recommendations   Food and Nutrition Delivery  Meals & Snacks: Protein-modified diet, Vitamin-modified diet  Goals: This RD checked with pts pharmacy whether prescribed MVI and Vit D were covered by insurance. She stated that although there was some level of coverage, pt is responsible for ~$2 for MVI and ~$11. This information was shared with pt. OT [place] [employer] was also reviewed as alternative option. She was encouraged to continue prioritizing lean protein at main meals. She demonstrated good understanding.    Nutrition Monitoring and Evaluation   Food and Nutrient Intake  Monitoring and Evaluation Plan: Micronutrient intake determination, Protein intake  Anthropometric measurements  Monitoring and Evaluation Plan: Weight  Body Weight: Usual stated body weight (UBW)  Biochemical Data, " Medical Tests and Procedures  Monitoring and Evaluation Plan: Electrolyte/renal panel, Nutritional anemia profile, Vitamin profile    Follow-up   Progress will be reassessed at next office visit. The estimated number of remaining follow-ups at this time: 3+ at a frequency of ~Q4 months. The total number and frequency of remaining follow-ups may change depending on patient's progress.     Time spent with patient: 60 minutes of which 50 percent or greater was spent counseling and or coordinating care.

## 2025-06-02 DIAGNOSIS — Z21 ASYMPTOMATIC HIV INFECTION, WITH NO HISTORY OF HIV-RELATED ILLNESS (MULTI): Chronic | ICD-10-CM

## 2025-06-02 DIAGNOSIS — I10 HYPERTENSION, UNSPECIFIED TYPE: ICD-10-CM

## 2025-06-02 RX ORDER — EMTRICITABINE 200 MG/1
200 CAPSULE ORAL DAILY
Qty: 30 CAPSULE | Refills: 5 | Status: SHIPPED | OUTPATIENT
Start: 2025-06-02

## 2025-06-02 RX ORDER — DOLUTEGRAVIR SODIUM 50 MG/1
TABLET, FILM COATED ORAL
Qty: 30 TABLET | Refills: 5 | Status: SHIPPED | OUTPATIENT
Start: 2025-06-02

## 2025-06-02 RX ORDER — AMLODIPINE BESYLATE 10 MG/1
10 TABLET ORAL DAILY
Qty: 90 TABLET | Refills: 1 | Status: SHIPPED | OUTPATIENT
Start: 2025-06-02

## 2025-06-09 ENCOUNTER — TELEPHONE (OUTPATIENT)
Dept: IMMUNOLOGY | Facility: CLINIC | Age: 50
End: 2025-06-09
Payer: COMMERCIAL

## 2025-06-09 NOTE — TELEPHONE ENCOUNTER
CHW called to check in however, CHW was not able to reach pt at this time. CHW LVM asking for a call back.

## 2025-08-04 DIAGNOSIS — I26.99 OTHER ACUTE PULMONARY EMBOLISM WITHOUT ACUTE COR PULMONALE: ICD-10-CM

## 2025-08-04 RX ORDER — APIXABAN 5 MG/1
5 TABLET, FILM COATED ORAL EVERY 12 HOURS
Qty: 60 TABLET | Refills: 5 | Status: SHIPPED | OUTPATIENT
Start: 2025-08-04

## 2025-08-12 ENCOUNTER — DOCUMENTATION (OUTPATIENT)
Dept: IMMUNOLOGY | Facility: CLINIC | Age: 50
End: 2025-08-12
Payer: COMMERCIAL

## 2025-08-15 ENCOUNTER — DOCUMENTATION (OUTPATIENT)
Dept: IMMUNOLOGY | Facility: CLINIC | Age: 50
End: 2025-08-15
Payer: COMMERCIAL

## 2025-08-29 DIAGNOSIS — I10 HYPERTENSION, UNSPECIFIED TYPE: ICD-10-CM

## 2025-08-29 RX ORDER — AMLODIPINE BESYLATE 10 MG/1
10 TABLET ORAL DAILY
Qty: 90 TABLET | Refills: 1 | Status: SHIPPED | OUTPATIENT
Start: 2025-08-29